# Patient Record
Sex: MALE | Race: WHITE | NOT HISPANIC OR LATINO | Employment: FULL TIME | URBAN - METROPOLITAN AREA
[De-identification: names, ages, dates, MRNs, and addresses within clinical notes are randomized per-mention and may not be internally consistent; named-entity substitution may affect disease eponyms.]

---

## 2018-03-14 ENCOUNTER — OFFICE VISIT (OUTPATIENT)
Dept: FAMILY MEDICINE | Facility: CLINIC | Age: 33
End: 2018-03-14
Payer: COMMERCIAL

## 2018-03-14 VITALS
OXYGEN SATURATION: 97 % | WEIGHT: 276.4 LBS | HEART RATE: 77 BPM | SYSTOLIC BLOOD PRESSURE: 122 MMHG | TEMPERATURE: 98 F | HEIGHT: 77 IN | RESPIRATION RATE: 14 BRPM | BODY MASS INDEX: 32.63 KG/M2 | DIASTOLIC BLOOD PRESSURE: 76 MMHG

## 2018-03-14 DIAGNOSIS — J20.8 ACUTE VIRAL BRONCHITIS: Primary | ICD-10-CM

## 2018-03-14 PROCEDURE — 99213 OFFICE O/P EST LOW 20 MIN: CPT | Performed by: FAMILY MEDICINE

## 2018-03-14 NOTE — MR AVS SNAPSHOT
After Visit Summary   3/14/2018    John Dang    MRN: [id number]           Patient Information     Date Of Birth          1985        Visit Information        Provider Department      3/14/2018 11:20 AM Dheeraj Monk MD [facility]        Today's Diagnoses     Acute viral bronchitis    -  1      Care Instructions    You preferred to take Robitussin DM over the counter; take 5-10 ml of this medication 4 hours apart as needed for coughing spells.    Stop Azithromycin.    You are strongly advised to avoid taking non-prescribed antibiotics or self-medicating with any antibiotics in the future.    Bronchitis, Viral (Adult)    You have a viral bronchitis. Bronchitis is inflammation and swelling of the lining of the lungs. This is often caused by an infection. Symptoms include a dry, hacking cough that is worse at night. The cough may bring up yellow-green mucus. You may also feel short of breath or wheeze. Other symptoms may include tiredness, chest discomfort, and chills.  Bronchitis that is caused by a virus is not treated with antibiotics. Instead, medicines may be given to help relieve symptoms. Symptoms can last up to 2 weeks, although the cough may last much longer.  This illness is contagious during the first few days and is spread through the air by coughing and sneezing, or by direct contact (touching the sick person and then touching your own eyes, nose, or mouth).  Most viral illnesses resolve within 10 to 14 days with rest and simple home remedies, although they may sometimes last for several weeks.  Home care    If symptoms are severe, rest at home for the first 2 to 3 days. When you go back to your usual activities, don't let yourself get too tired.    Do not smoke. Also avoid being exposed to secondhand smoke.    You may use over-the-counter medicine to control fever or pain, unless another pain medicine was prescribed. (Note: If you have chronic liver  or kidney disease or have ever had a stomach ulcer or gastrointestinal bleeding, talk with your healthcare provider before using these medicines. Also talk to your provider if you are taking medicine to prevent blood clots.) Aspirin should never be given to anyone younger than 18 years of age who is ill with a viral infection or fever. It may cause severe liver or brain damage.    Your appetite may be poor, so a light diet is fine. Avoid dehydration by drinking 6 to 8 glasses of fluids per day (such as water, soft drinks, sports drinks, juices, tea, or soup). Extra fluids will help loosen secretions in the nose and lungs.    Over-the-counter cough, cold, and sore-throat medicines will not shorten the length of the illness, but they may help to reduce symptoms. (Note: Do not use decongestants if you have high blood pressure.)  Follow-up care  Follow up with your healthcare provider, or as advised. If you had an X-ray or ECG (electrocardiogram), a specialist will review it. You will be notified of any new findings that may affect your care.  Note: If you are age 65 or older, or if you have a chronic lung disease or condition that affects your immune system, or you smoke, talk to your healthcare provider about having pneumococcal vaccinations and a yearly influenza vaccination (flu shot).  When to seek medical advice  Call your healthcare provider right away if any of these occur:    Fever of 100.4 F (38 C) or higher    Coughing up increased amounts of colored sputum    Weakness, drowsiness, headache, facial pain, ear pain, or a stiff neck  Call 911, or get immediate medical care  Contact emergency services right away if any of these occur:    Coughing up blood    Worsening weakness, drowsiness, headache, or stiff neck    Trouble breathing, wheezing, or pain with breathing  Date Last Reviewed: 9/13/2015 2000-2017 [employer]. [address] [facility], [place] [address]. All rights reserved. This  "information is not intended as a substitute for professional medical care. Always follow your healthcare professional's instructions.                Follow-ups after your visit        Who to contact     If you have questions or need follow up information about today's clinic visit or your schedule please contact [facility] directly at [phone].  Normal or non-critical lab and imaging results will be communicated to you by MyChart, letter or phone within 4 business days after the clinic has received the results. If you do not hear from us within 7 days, please contact the clinic through [employer] or phone. If you have a critical or abnormal lab result, we will notify you by phone as soon as possible.  Submit refill requests through [employer] or call your pharmacy and they will forward the refill request to us. Please allow 3 business days for your refill to be completed.          Additional Information About Your Visit        MyChart Information     [employer] gives you secure access to your electronic health record. If you see a primary care provider, you can also send messages to your care team and make appointments. If you have questions, please call your primary care clinic.  If you do not have a primary care provider, please call [phone] and they will assist you.        Care EveryWhere ID     This is your Care EveryWhere ID. This could be used by other organizations to access your [facility] medical records  [id number]        Your Vitals Were     Pulse Temperature Respirations Height Pulse Oximetry BMI (Body Mass Index)    77 98  F (36.7  C) (Tympanic) 14 6' 4.5\" (1.943 m) 97% 33.21 kg/m2       Blood Pressure from Last 3 Encounters:   03/14/18 122/76   08/26/15 134/78   11/13/13 132/81    Weight from Last 3 Encounters:   03/14/18 276 lb 6.4 oz (125.4 kg)   08/26/15 252 lb 9.6 oz (114.6 kg)   11/13/13 236 lb 6.4 oz (107.2 kg)              Today, you had the following     No orders found for " display       Primary Care Provider Office Phone # Fax #    [facility] [phone]       [address] [facility] [address]  [place] [address]        Equal Access to Services     LILLIE NEELY : Hadii aad ku hadsallieo Soomaali, waaxda luqadaha, qaybta kaalmada adeegyada, greg cuellarn carmella wise laSuechristiane medley. So [facility] [phone].    ATENCIÓN: Si habla español, tiene a zhou disposición servicios gratuitos de asistencia lingüística. Llame al [phone].    We comply with applicable federal civil rights laws and [place] laws. We do not discriminate on the basis of race, color, national origin, age, disability, sex, sexual orientation, or gender identity.            Thank you!     Thank you for choosing [facility]  for your care. Our goal is always to provide you with excellent care. Hearing back from our patients is one way we can continue to improve our services. Please take a few minutes to complete the written survey that you may receive in the mail after your visit with us. Thank you!             Your Updated Medication List - Protect others around you: Learn how to safely use, store and throw away your medicines at www.disposemymeds.org.      Notice  As of 3/14/2018 11:30 AM    You have not been prescribed any medications.

## 2018-03-14 NOTE — PROGRESS NOTES
"  SUBJECTIVE:   John Dang is a 33 year old male who presents to clinic today for the following health issues:  {Provider please address medication reconciliation discrepancies--rooming staff please delete if no med/rec issues}    FOLLOW UP BRONCHITIS      Duration: ***    Description  { :775346}    Severity: {MILD, MODERATE, SEVERE LOW:255318}    Accompanying signs and symptoms: {NONE DEFAULTED:418426::\"None\"}    History (predisposing factors):  { :498937::\"none\"}    Precipitating or alleviating factors: {NONE DEFAULTED:754746::\"None\"}    Therapies tried and outcome:  { :490477::\"none\"}      {additional problems for provider to add:915751}    Problem list and histories reviewed & adjusted, as indicated.  Additional history: {NONE - AS DOCUMENTED:441081::\"as documented\"}    {HIST REVIEW/ LINKS 2:803617}    Reviewed and updated as needed this visit by clinical staff       Reviewed and updated as needed this visit by Provider         {PROVIDER CHARTING PREFERENCE:767100}  "

## 2018-03-14 NOTE — PATIENT INSTRUCTIONS
You preferred to take Robitussin DM over the counter; take 5-10 ml of this medication 4 hours apart as needed for coughing spells.    Stop Azithromycin.    You are strongly advised to avoid taking non-prescribed antibiotics or self-medicating with any antibiotics in the future.    Bronchitis, Viral (Adult)    You have a viral bronchitis. Bronchitis is inflammation and swelling of the lining of the lungs. This is often caused by an infection. Symptoms include a dry, hacking cough that is worse at night. The cough may bring up yellow-green mucus. You may also feel short of breath or wheeze. Other symptoms may include tiredness, chest discomfort, and chills.  Bronchitis that is caused by a virus is not treated with antibiotics. Instead, medicines may be given to help relieve symptoms. Symptoms can last up to 2 weeks, although the cough may last much longer.  This illness is contagious during the first few days and is spread through the air by coughing and sneezing, or by direct contact (touching the sick person and then touching your own eyes, nose, or mouth).  Most viral illnesses resolve within 10 to 14 days with rest and simple home remedies, although they may sometimes last for several weeks.  Home care    If symptoms are severe, rest at home for the first 2 to 3 days. When you go back to your usual activities, don't let yourself get too tired.    Do not smoke. Also avoid being exposed to secondhand smoke.    You may use over-the-counter medicine to control fever or pain, unless another pain medicine was prescribed. (Note: If you have chronic liver or kidney disease or have ever had a stomach ulcer or gastrointestinal bleeding, talk with your healthcare provider before using these medicines. Also talk to your provider if you are taking medicine to prevent blood clots.) Aspirin should never be given to anyone younger than 18 years of age who is ill with a viral infection or fever. It may cause severe liver or brain  damage.    Your appetite may be poor, so a light diet is fine. Avoid dehydration by drinking 6 to 8 glasses of fluids per day (such as water, soft drinks, sports drinks, juices, tea, or soup). Extra fluids will help loosen secretions in the nose and lungs.    Over-the-counter cough, cold, and sore-throat medicines will not shorten the length of the illness, but they may help to reduce symptoms. (Note: Do not use decongestants if you have high blood pressure.)  Follow-up care  Follow up with your healthcare provider, or as advised. If you had an X-ray or ECG (electrocardiogram), a specialist will review it. You will be notified of any new findings that may affect your care.  Note: If you are age 65 or older, or if you have a chronic lung disease or condition that affects your immune system, or you smoke, talk to your healthcare provider about having pneumococcal vaccinations and a yearly influenza vaccination (flu shot).  When to seek medical advice  Call your healthcare provider right away if any of these occur:    Fever of 100.4 F (38 C) or higher    Coughing up increased amounts of colored sputum    Weakness, drowsiness, headache, facial pain, ear pain, or a stiff neck  Call 911, or get immediate medical care  Contact emergency services right away if any of these occur:    Coughing up blood    Worsening weakness, drowsiness, headache, or stiff neck    Trouble breathing, wheezing, or pain with breathing  Date Last Reviewed: 9/13/2015 2000-2017 [employer]. [address] [place] [address], [place] [address]. All rights reserved. This information is not intended as a substitute for professional medical care. Always follow your healthcare professional's instructions.

## 2018-03-14 NOTE — PROGRESS NOTES
SUBJECTIVE:   John Dang is a 33 year old male who presents to clinic today for the following health issues:    Acute Illness   Acute illness concerns: Cough, possible bronchitis  Onset:  3 days    Fever: no    Chills/Sweats: YES- both    Headache (location?): YES    Sinus Pressure:YES    Conjunctivitis:  YES: left- behind eye    Ear Pain: YES: left    Rhinorrhea: YES    Congestion: YES    Sore Throat: YES     Cough: YES    Wheeze: YES    Decreased Appetite: YES    Nausea: no    Vomiting: no    Diarrhea:  no    Dysuria/Freq.: no    Fatigue/Achiness: YES- fatigue    Sick/Strep Exposure: no     Therapies Tried and outcome: ibuprofen and azithromycin (had some at home) - hasn't made a difference.    Verified above history with patient.      Problem list and histories reviewed & adjusted, as indicated.  Additional history: as documented    Patient Active Problem List   Diagnosis     CARDIOVASCULAR SCREENING; LDL GOAL LESS THAN 160     Past Surgical History:   Procedure Laterality Date     SOFT TISSUE SURGERY         Social History   Substance Use Topics     Smoking status: Never Smoker     Smokeless tobacco: Never Used     Alcohol use 1.0 oz/week     2 Standard drinks or equivalent per week      Comment: occ.     Family History   Problem Relation Age of Onset     DIABETES Mother      Arthritis Mother      Depression Mother      Arthritis Father      Arthritis Maternal Grandmother      Eye Disorder Paternal Grandmother          No current outpatient prescriptions on file.     No Known Allergies    Reviewed and updated as needed this visit by clinical staff  Tobacco  Allergies  Meds  Med Hx  Surg Hx  Fam Hx  Soc Hx      Reviewed and updated as needed this visit by Provider         ROS:  C: NEGATIVE for fever, chills or  change in weight  I: NEGATIVE for worrisome rashes, moles or lesions  E: NEGATIVE for vision changes or irritation  ENT/MOUTH: see above  RESP:as above  CV: NEGATIVE for chest pain,  "palpitations or peripheral edema  GI: NEGATIVE for nausea, abdominal pain, heartburn, or change in bowel habits  M: NEGATIVE for significant arthralgias or myalgia    OBJECTIVE:                                                    /76  Pulse 77  Temp 98  F (36.7  C) (Tympanic)  Resp 14  Ht 6' 4.5\" (1.943 m)  Wt 276 lb 6.4 oz (125.4 kg)  SpO2 97%  BMI 33.21 kg/m2  Body mass index is 33.21 kg/(m^2).  GENERAL: well-nourished,  alert and no distress  EYES: pink conjunctivae, no icterus  NECK: moderate cervical adenopathy, nontender  HEENT: tympanic membrane intact and pearly bilaterally, nose with mild congestion, no sinus tenderness, throat moderately erythematous, tonsils grade +1 w/ no exudates, no oral ulcers  RESP: good and equal air entry, equal breath sounds, no wheezing; no crackles  CV: normal rate, regular rhythm, normal S1 S2, no S3 or S4 and no murmur  SKIN:  Good turgor, no rashes    Diagnostic test results:  Diagnostic Test Results:  No results found for this or any previous visit (from the past 24 hour(s)).     ASSESSMENT/PLAN:                                                        ICD-10-CM    1. Acute viral bronchitis J20.8      Patient was resassured no sign of bacterial process today.  Patient was advised to stop azithromycin.    Discussed symptoms are likely due to viral etiology. Discussed usual course of viral infections; self-limited.   Advised to take plenty of oral fluids. Advised adequate rest. General hygiene.     Offered Rx for benzonatate as antitussive, but patient preferred to take OTC Robitussin DM prn. Use discussed with patient.    Advised to see doctor again if worsening or with new sx.      Follow up with Provider - 2-3 days if with worsening or if febrile   Patient Instructions   You preferred to take Robitussin DM over the counter; take 5-10 ml of this medication 4 hours apart as needed for coughing spells.    Stop Azithromycin.    You are strongly advised to avoid taking " non-prescribed antibiotics or self-medicating with any antibiotics in the future.    Bronchitis, Viral (Adult)    You have a viral bronchitis. Bronchitis is inflammation and swelling of the lining of the lungs. This is often caused by an infection. Symptoms include a dry, hacking cough that is worse at night. The cough may bring up yellow-green mucus. You may also feel short of breath or wheeze. Other symptoms may include tiredness, chest discomfort, and chills.  Bronchitis that is caused by a virus is not treated with antibiotics. Instead, medicines may be given to help relieve symptoms. Symptoms can last up to 2 weeks, although the cough may last much longer.  This illness is contagious during the first few days and is spread through the air by coughing and sneezing, or by direct contact (touching the sick person and then touching your own eyes, nose, or mouth).  Most viral illnesses resolve within 10 to 14 days with rest and simple home remedies, although they may sometimes last for several weeks.  Home care    If symptoms are severe, rest at home for the first 2 to 3 days. When you go back to your usual activities, don't let yourself get too tired.    Do not smoke. Also avoid being exposed to secondhand smoke.    You may use over-the-counter medicine to control fever or pain, unless another pain medicine was prescribed. (Note: If you have chronic liver or kidney disease or have ever had a stomach ulcer or gastrointestinal bleeding, talk with your healthcare provider before using these medicines. Also talk to your provider if you are taking medicine to prevent blood clots.) Aspirin should never be given to anyone younger than 18 years of age who is ill with a viral infection or fever. It may cause severe liver or brain damage.    Your appetite may be poor, so a light diet is fine. Avoid dehydration by drinking 6 to 8 glasses of fluids per day (such as water, soft drinks, sports drinks, juices, tea, or soup).  Extra fluids will help loosen secretions in the nose and lungs.    Over-the-counter cough, cold, and sore-throat medicines will not shorten the length of the illness, but they may help to reduce symptoms. (Note: Do not use decongestants if you have high blood pressure.)  Follow-up care  Follow up with your healthcare provider, or as advised. If you had an X-ray or ECG (electrocardiogram), a specialist will review it. You will be notified of any new findings that may affect your care.  Note: If you are age 65 or older, or if you have a chronic lung disease or condition that affects your immune system, or you smoke, talk to your healthcare provider about having pneumococcal vaccinations and a yearly influenza vaccination (flu shot).  When to seek medical advice  Call your healthcare provider right away if any of these occur:    Fever of 100.4 F (38 C) or higher    Coughing up increased amounts of colored sputum    Weakness, drowsiness, headache, facial pain, ear pain, or a stiff neck  Call 911, or get immediate medical care  Contact emergency services right away if any of these occur:    Coughing up blood    Worsening weakness, drowsiness, headache, or stiff neck    Trouble breathing, wheezing, or pain with breathing  Date Last Reviewed: 9/13/2015 2000-2017 [employer]. [address] [place] [address], [place] [id number]. All rights reserved. This information is not intended as a substitute for professional medical care. Always follow your healthcare professional's instructions.            Dheeraj Monk MD  [facility]

## 2018-03-14 NOTE — NURSING NOTE
"Chief Complaint   Patient presents with     Cough       Initial /87  Pulse 77  Temp 98  F (36.7  C) (Tympanic)  Resp 16  Ht 6' 4.5\" (1.943 m)  Wt 276 lb 6.4 oz (125.4 kg)  SpO2 97%  BMI 33.21 kg/m2 Estimated body mass index is 33.21 kg/(m^2) as calculated from the following:    Height as of this encounter: 6' 4.5\" (1.943 m).    Weight as of this encounter: 276 lb 6.4 oz (125.4 kg).  Medication Reconciliation: complete    "

## 2018-06-18 ENCOUNTER — RADIANT APPOINTMENT (OUTPATIENT)
Dept: GENERAL RADIOLOGY | Facility: CLINIC | Age: 33
End: 2018-06-18
Attending: PHYSICIAN ASSISTANT
Payer: COMMERCIAL

## 2018-06-18 ENCOUNTER — OFFICE VISIT (OUTPATIENT)
Dept: ORTHOPEDICS | Facility: CLINIC | Age: 33
End: 2018-06-18
Payer: COMMERCIAL

## 2018-06-18 VITALS
WEIGHT: 283 LBS | RESPIRATION RATE: 16 BRPM | SYSTOLIC BLOOD PRESSURE: 121 MMHG | BODY MASS INDEX: 34.46 KG/M2 | DIASTOLIC BLOOD PRESSURE: 74 MMHG | HEIGHT: 76 IN

## 2018-06-18 DIAGNOSIS — M25.561 ACUTE PAIN OF RIGHT KNEE: Primary | ICD-10-CM

## 2018-06-18 DIAGNOSIS — M25.561 ACUTE PAIN OF RIGHT KNEE: ICD-10-CM

## 2018-06-18 DIAGNOSIS — M70.51 PES ANSERINUS BURSITIS OF RIGHT KNEE: ICD-10-CM

## 2018-06-18 PROCEDURE — 99203 OFFICE O/P NEW LOW 30 MIN: CPT | Performed by: ORTHOPAEDIC SURGERY

## 2018-06-18 PROCEDURE — 73562 X-RAY EXAM OF KNEE 3: CPT | Mod: RT

## 2018-06-18 ASSESSMENT — PAIN SCALES - GENERAL: PAINLEVEL: EXTREME PAIN (8)

## 2018-06-18 NOTE — PROGRESS NOTES
"CHIEF COMPLAINT:   Chief Complaint   Patient presents with     Knee Pain     Right medial knee pain. Onset: 6/14/18, with NKI. Woke him in the middle of the night. Pain is worse in the morning and gets better throughout the course of the day. He does have a difficult time staying asleep at night because positional changes of the knee are painful.       HISTORY OF PRESENT ILLNESS    John \"Nader\" BRITNEY Dang is a 33 year old male seen for evaluation of ongoing right knee pain with no known injury.   Pain has been present since 6/14/2018. He denies any change or increase in activity. Today he has extreme pain, rated an 8/10. Pain is mostly located over the medial knee, but also along the inferior knee. Pain is worsened with any twisting the knee. Also has pain with flexion and extension. He did not have any swelling. Pain is mostly in the morning. As the day continues, his pain improves, 3/10. He will have some night pain with re-positioning.     Of note: will have right muscle cramps when he is dehydrated. No history of gout.     Present symptoms: pain medial. No swelling. No locking, catching or giving way.  Pain severity: 3/10 up to 8/10 in the morning  Frequency of symptoms: frequently  Exacerbating Factors: morning, with knee flexion and extension  Relieving Factors: rest  Night Pain: Yes  Pain while at rest: Yes   Numbness or tingling: No  Patient has tried:     NSAIDS: No      Physical Therapy: No      Activity modification: Yes      Bracing: No      Injections: No     Ice: No      Assistive device:  No     Other: none    Orthopaedic PMH: none    Other PMH:  has a past medical history of No significant past medical history.  Patient Active Problem List    Diagnosis Date Noted     CARDIOVASCULAR SCREENING; LDL GOAL LESS THAN 160 12/10/2012     Priority: Medium       Surgical Hx:  has a past surgical history that includes Soft tissue surgery.    Medications: No current outpatient prescriptions on " "file.    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 He has never used smokeless tobacco. He reports that he drinks about 1.0 oz of alcohol per week  He reports that he does not use illicit drugs.    Family Hx: family history includes Arthritis in his father, maternal grandmother, and mother; DIABETES in his mother; Depression in his mother; Eye Disorder in his paternal grandmother.    REVIEW OF SYSTEMS: 10 point ROS neg other than the symptoms noted above in the HPI and PMH. Notables include  CONSTITUTIONAL:NEGATIVE for fever, chills, change in weight  INTEGUMENTARY/SKIN: NEGATIVE for worrisome rashes, moles or lesions  MUSCULOSKELETAL:See HPI above  NEURO: NEGATIVE for weakness, dizziness or paresthesias    This document serves as a record of the services and decisions personally performed and made by Kevin Bates MD. It was created on his behalf by Chela Williamson, a trained medical scribe. The creation of this document is based the provider's statements to the medical scribe.    Scribe Chela Williamson 4:14 PM 6/18/2018     PHYSICAL EXAM:  /74 (BP Location: Right arm)  Resp 16  Ht 1.93 m (6' 4\")  Wt 128.4 kg (283 lb)  BMI 34.45 kg/m2   GENERAL APPEARANCE: healthy, alert, no distress  SKIN: no suspicious lesions or rashes  NEURO: Normal strength and tone, mentation intact and speech normal  PSYCH:  mentation appears normal and affect normal, not anxious  RESPIRATORY: No increased work of breathing.  HANDS: no clubbing, nail pitting.    BILATERAL LOWER EXTREMITIES:  Gait: normal  Alignment: varus  No gross deformities or masses.  No Quad atrophy, strength normal.  Intact sensation deep peroneal nerve, superficial peroneal nerve, med/lat tibial nerve, sural nerve, saphenous nerve  Intact EHL, EDL, TA, FHL, GS, quadriceps hamstrings and hip flexors  Toes warm and well perfused, brisk capillary refill. Palpable 2+ dp pulses.  Bilateral calf soft and nttp or squeeze.  No " palpable popliteal lymphadenopathy.  DTRs: achilles 2+, patella 2+.  Edema: none  Hips with full, pain-free motion. No irritability with flexion, adduction, and internal rotation.    LEFT KNEE EXAM:    Skin: intact, no ecchymosis or erythema  Squat: 100 %, not limited by pain.     ROM: 2 hyperextension to 135 flexion  Tight hamstrings on straight leg raise.  Effusion: none  Tender: NTTP med/lat joint line, anterior or posterior knee  McMurrays: negative    MCL: stable, and non-painful at both 0 and 30 degrees knee flexion  Varus stress: stable, and non-painful at both 0 and 30 degrees knee flexion  Lachmans: neg, firm endpoint  Posterior Drawer stable  Patellofemoral joint:                Apprehension: negative              Crepitations: minimal   Grind: negative     RIGHT KNEE EXAM:    Skin: intact, no ecchymosis or erythema  Squat: 50 %, with anterior knee stiffness.   ROM: 2 hyperextension to 135 flexion  Tight hamstrings on straight leg raise.  Effusion: none  Tender: posteromedial knee, pes anserine bursa, hamstrings  NTTP med/lat joint line, anterior or posterior knee  McMurrays: positive - medial    MCL: stable, and non-painful at both 0 and 30 degrees knee flexion  Varus stress: stable, and non-painful at both 0 and 30 degrees knee flexion  Lachmans: neg, firm endpoint  Posterior Drawer stable  Patellofemoral joint:                Apprehension: negative              Crepitations: minimal   Grind: negative     X-RAY:  3 views right knee from 6/18/2018 were reviewed in clinic today. On my review, no obvious fractures or dislocations. Preserved joint spaces.      Impression:   33 year old male with acute right knee pain, pes anserine bursitis, hamstring tendonitis    Plan:   * reviewed imaging studies with patient  * likely hamstrings tendonitis and pes anserine bursitis    Treatment:    * rest, sitting  * Activity modification - avoid impact activities or activities that aggravate symptoms. Avoid repetitive  activities.  * NSAIDS (non-steroidal anti-inflammatory medications; e.g. Aleve, advil, motrin, ibuprofen) - regular use for inflammation ( twice daily or three times daily), with food, as long as no contra-indications Please discuss with primary care doctor if needed  * ice, 15-20 minutes at a time several times a day or as needed.  * Strengthening of quadriceps muscles  * Physical Therapy for strengthening, stretching and range of motion exercises of legs  * Tylenol as needed for pain, consider Tylenol arthritis or similar  * Weight loss: Weight loss:  Body mass index is 34.45 kg/(m^2).. weight loss benefits, not only for the current pain symptoms, but also overall health. Recommend a good diet plan that works for the patient, with the assistance of a dietician or primary care doctor as needed. Also, a good, low-impact exercise program for at least 20 minutes per day, 3 times per week, such as exercise bike, elliptical , or pool.  * Exercise: low impact such as stationary bike, elliptical, pool.  * Injections: cortisone; risks and perceived benefits discussed today. Patient elects NOT to proceed.  * Bracing: bracing the knee may offer some relief of symptoms when worn and provide some stability.  * over the counter supplements such as glucosamine and chondroitin sulfate may help with joint pain.  * topical ointments may help as well    * return to clinic as needed.      The information in this document, created by a scribe for me, accurately reflects the services I personally performed and the decisions made by me. I have reviewed and approved this document for accuracy.     Kevin Bates M.D., M.S.  Dept. of Orthopaedic Surgery  [facility]

## 2018-06-18 NOTE — LETTER
"    6/18/2018         RE: John Dang  [id number] [facility] [address]        Dear Colleague,    Thank you for referring your patient, John Dang, to the [facility] [place]. Please see a copy of my visit note below.    CHIEF COMPLAINT:   Chief Complaint   Patient presents with     Knee Pain     Right medial knee pain. Onset: 6/14/18, with NKI. Woke him in the middle of the night. Pain is worse in the morning and gets better throughout the course of the day. He does have a difficult time staying asleep at night because positional changes of the knee are painful.       HISTORY OF PRESENT ILLNESS    John \"Nader\"BRITNEY Dang is a 33 year old male seen for evaluation of ongoing right knee pain with no known injury.   Pain has been present since 6/14/2018. He denies any change or increase in activity. Today he has extreme pain, rated an 8/10. Pain is mostly located over the medial knee, but also along the inferior knee. Pain is worsened with any twisting the knee. Also has pain with flexion and extension. He did not have any swelling. Pain is mostly in the morning. As the day continues, his pain improves, 3/10. He will have some night pain with re-positioning.     Of note: will have right muscle cramps when he is dehydrated. No history of gout.     Present symptoms: pain medial. No swelling. No locking, catching or giving way.  Pain severity: 3/10 up to 8/10 in the morning  Frequency of symptoms: frequently  Exacerbating Factors: morning, with knee flexion and extension  Relieving Factors: rest  Night Pain: Yes  Pain while at rest: Yes   Numbness or tingling: No  Patient has tried:     NSAIDS: No      Physical Therapy: No      Activity modification: Yes      Bracing: No      Injections: No     Ice: No      Assistive device:  No     Other: none    Orthopaedic PMH: none    Other PMH:  has a past medical history of No significant past medical history.  Patient Active " "Problem List    Diagnosis Date Noted     CARDIOVASCULAR SCREENING; LDL GOAL LESS THAN 160 12/10/2012     Priority: Medium       Surgical Hx:  has a past surgical history that includes Soft tissue surgery.    Medications: No current outpatient prescriptions on file.    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 He has never used smokeless tobacco. He reports that he drinks about 1.0 oz of alcohol per week  He reports that he does not use illicit drugs.    Family Hx: family history includes Arthritis in his father, maternal grandmother, and mother; DIABETES in his mother; Depression in his mother; Eye Disorder in his paternal grandmother.    REVIEW OF SYSTEMS: 10 point ROS neg other than the symptoms noted above in the HPI and PMH. Notables include  CONSTITUTIONAL:NEGATIVE for fever, chills, change in weight  INTEGUMENTARY/SKIN: NEGATIVE for worrisome rashes, moles or lesions  MUSCULOSKELETAL:See HPI above  NEURO: NEGATIVE for weakness, dizziness or paresthesias    This document serves as a record of the services and decisions personally performed and made by Kevin Bates MD. It was created on his behalf by Chela Williamson, a trained medical scribe. The creation of this document is based the provider's statements to the medical scribe.    Scribe Chela Williamson 4:14 PM 6/18/2018     PHYSICAL EXAM:  /74 (BP Location: Right arm)  Resp 16  Ht 1.93 m (6' 4\")  Wt 128.4 kg (283 lb)  BMI 34.45 kg/m2   GENERAL APPEARANCE: healthy, alert, no distress  SKIN: no suspicious lesions or rashes  NEURO: Normal strength and tone, mentation intact and speech normal  PSYCH:  mentation appears normal and affect normal, not anxious  RESPIRATORY: No increased work of breathing.  HANDS: no clubbing, nail pitting.    BILATERAL LOWER EXTREMITIES:  Gait: normal  Alignment: varus  No gross deformities or masses.  No Quad atrophy, strength normal.  Intact sensation deep peroneal nerve, " superficial peroneal nerve, med/lat tibial nerve, sural nerve, saphenous nerve  Intact EHL, EDL, TA, FHL, GS, quadriceps hamstrings and hip flexors  Toes warm and well perfused, brisk capillary refill. Palpable 2+ dp pulses.  Bilateral calf soft and nttp or squeeze.  No palpable popliteal lymphadenopathy.  DTRs: achilles 2+, patella 2+.  Edema: none  Hips with full, pain-free motion. No irritability with flexion, adduction, and internal rotation.    LEFT KNEE EXAM:    Skin: intact, no ecchymosis or erythema  Squat: 100 %, not limited by pain.     ROM: 2 hyperextension to 135 flexion  Tight hamstrings on straight leg raise.  Effusion: none  Tender: NTTP med/lat joint line, anterior or posterior knee  McMurrays: negative    MCL: stable, and non-painful at both 0 and 30 degrees knee flexion  Varus stress: stable, and non-painful at both 0 and 30 degrees knee flexion  Lachmans: neg, firm endpoint  Posterior Drawer stable  Patellofemoral joint:                Apprehension: negative              Crepitations: minimal   Grind: negative     RIGHT KNEE EXAM:    Skin: intact, no ecchymosis or erythema  Squat: 50 %, with anterior knee stiffness.   ROM: 2 hyperextension to 135 flexion  Tight hamstrings on straight leg raise.  Effusion: none  Tender: posteromedial knee, pes anserine bursa, hamstrings  NTTP med/lat joint line, anterior or posterior knee  McMurrays: positive - medial    MCL: stable, and non-painful at both 0 and 30 degrees knee flexion  Varus stress: stable, and non-painful at both 0 and 30 degrees knee flexion  Lachmans: neg, firm endpoint  Posterior Drawer stable  Patellofemoral joint:                Apprehension: negative              Crepitations: minimal   Grind: negative     X-RAY:  3 views right knee from 6/18/2018 were reviewed in clinic today. On my review, no obvious fractures or dislocations. Preserved joint spaces.      Impression:   33 year old male with acute right knee pain, pes anserine bursitis,  hamstring tendonitis    Plan:   * reviewed imaging studies with patient  * likely hamstrings tendonitis and pes anserine bursitis    Treatment:    * rest, sitting  * Activity modification - avoid impact activities or activities that aggravate symptoms. Avoid repetitive activities.  * NSAIDS (non-steroidal anti-inflammatory medications; e.g. Aleve, advil, motrin, ibuprofen) - regular use for inflammation ( twice daily or three times daily), with food, as long as no contra-indications Please discuss with primary care doctor if needed  * ice, 15-20 minutes at a time several times a day or as needed.  * Strengthening of quadriceps muscles  * Physical Therapy for strengthening, stretching and range of motion exercises of legs  * Tylenol as needed for pain, consider Tylenol arthritis or similar  * Weight loss: Weight loss:  Body mass index is 34.45 kg/(m^2).. weight loss benefits, not only for the current pain symptoms, but also overall health. Recommend a good diet plan that works for the patient, with the assistance of a dietician or primary care doctor as needed. Also, a good, low-impact exercise program for at least 20 minutes per day, 3 times per week, such as exercise bike, elliptical , or pool.  * Exercise: low impact such as stationary bike, elliptical, pool.  * Injections: cortisone; risks and perceived benefits discussed today. Patient elects NOT to proceed.  * Bracing: bracing the knee may offer some relief of symptoms when worn and provide some stability.  * over the counter supplements such as glucosamine and chondroitin sulfate may help with joint pain.  * topical ointments may help as well    * return to clinic as needed.      The information in this document, created by a scribe for me, accurately reflects the services I personally performed and the decisions made by me. I have reviewed and approved this document for accuracy.     Kevin Bates M.D., M.S.  Dept. of Orthopaedic Surgery  [facility]        Again, thank you for allowing me to participate in the care of your patient.        Sincerely,        Kevin Bates MD

## 2019-01-21 ENCOUNTER — OFFICE VISIT (OUTPATIENT)
Dept: FAMILY MEDICINE | Facility: CLINIC | Age: 34
End: 2019-01-21
Payer: COMMERCIAL

## 2019-01-21 VITALS
WEIGHT: 303.6 LBS | BODY MASS INDEX: 36.97 KG/M2 | RESPIRATION RATE: 16 BRPM | OXYGEN SATURATION: 97 % | SYSTOLIC BLOOD PRESSURE: 128 MMHG | DIASTOLIC BLOOD PRESSURE: 78 MMHG | HEIGHT: 76 IN | TEMPERATURE: 98.2 F | HEART RATE: 81 BPM

## 2019-01-21 DIAGNOSIS — F41.1 GAD (GENERALIZED ANXIETY DISORDER): Primary | ICD-10-CM

## 2019-01-21 DIAGNOSIS — F32.1 MODERATE MAJOR DEPRESSION (H): ICD-10-CM

## 2019-01-21 PROCEDURE — 99213 OFFICE O/P EST LOW 20 MIN: CPT | Performed by: NURSE PRACTITIONER

## 2019-01-21 RX ORDER — CITALOPRAM HYDROBROMIDE 20 MG/1
TABLET ORAL
Qty: 90 TABLET | Refills: 0 | Status: SHIPPED | OUTPATIENT
Start: 2019-01-21 | End: 2019-04-19

## 2019-01-21 ASSESSMENT — ANXIETY QUESTIONNAIRES
IF YOU CHECKED OFF ANY PROBLEMS ON THIS QUESTIONNAIRE, HOW DIFFICULT HAVE THESE PROBLEMS MADE IT FOR YOU TO DO YOUR WORK, TAKE CARE OF THINGS AT HOME, OR GET ALONG WITH OTHER PEOPLE: EXTREMELY DIFFICULT
1. FEELING NERVOUS, ANXIOUS, OR ON EDGE: NEARLY EVERY DAY
7. FEELING AFRAID AS IF SOMETHING AWFUL MIGHT HAPPEN: NEARLY EVERY DAY
3. WORRYING TOO MUCH ABOUT DIFFERENT THINGS: NEARLY EVERY DAY
GAD7 TOTAL SCORE: 20
5. BEING SO RESTLESS THAT IT IS HARD TO SIT STILL: MORE THAN HALF THE DAYS
2. NOT BEING ABLE TO STOP OR CONTROL WORRYING: NEARLY EVERY DAY
6. BECOMING EASILY ANNOYED OR IRRITABLE: NEARLY EVERY DAY

## 2019-01-21 ASSESSMENT — MIFFLIN-ST. JEOR: SCORE: 2413.62

## 2019-01-21 ASSESSMENT — PATIENT HEALTH QUESTIONNAIRE - PHQ9: 5. POOR APPETITE OR OVEREATING: NEARLY EVERY DAY

## 2019-01-21 NOTE — PROGRESS NOTES
"  SUBJECTIVE:   Jhon Dang is a 33 year old male who presents to clinic today for the following health issues:      Abnormal Mood Symptoms  Onset: years, worsening and starting to cause problems    Description:   Depression: YES  Anxiety: YES    Accompanying Signs & Symptoms:  Still participating in activities that you used to enjoy: YES  Fatigue: no   Irritability: YES  Difficulty concentrating: YES  Changes in appetite: no   Problems with sleep: wakes early  Heart racing/beating fast : no   Thoughts of hurting yourself or others: yes    History:   Recent stress: YES- work and relationship   Prior depression hospitalization: None  Family history of depression: YES- states \"I assume so\"  Family history of anxiety: unsure   Mother with hyperthyroidism. Denies abnormal weight gain/loss, night sweats, heart palpitations, diarrhea/constipation, hair loss.    Precipitating factors:   Alcohol/drug use: YES- alcohol, between 2 and 10 drinks a few times a month     Alleviating factors:  None     Therapies Tried and outcome: tried a medication approximately 10 years ago, he doesn't remember what kind and only took it for a month and then stopped.     Problem list and histories reviewed & adjusted, as indicated.  Additional history: as documented    Patient Active Problem List   Diagnosis     CARDIOVASCULAR SCREENING; LDL GOAL LESS THAN 160     Past Surgical History:   Procedure Laterality Date     SOFT TISSUE SURGERY         Social History     Tobacco Use     Smoking status: Never Smoker     Smokeless tobacco: Never Used   Substance Use Topics     Alcohol use: Yes     Alcohol/week: 1.0 oz     Types: 2 Standard drinks or equivalent per week     Comment: occ.     Family History   Problem Relation Age of Onset     Diabetes Mother      Arthritis Mother      Depression Mother      Arthritis Father      Arthritis Maternal Grandmother      Eye Disorder Paternal Grandmother            Reviewed and updated as needed this " "visit by clinical staff       Reviewed and updated as needed this visit by Provider         ROS:  Constitutional, HEENT, cardiovascular, pulmonary, gi and gu systems are negative, except as otherwise noted.    OBJECTIVE:     /78   Pulse 81   Temp 98.2  F (36.8  C) (Tympanic)   Resp 16   Ht 1.93 m (6' 4\")   Wt 137.7 kg (303 lb 9.6 oz)   SpO2 97%   BMI 36.96 kg/m    Body mass index is 36.96 kg/m .  GENERAL: healthy, alert and no distress  NECK: no adenopathy, no asymmetry, masses, or scars and thyroid normal to palpation  RESP: lungs clear to auscultation - no rales, rhonchi or wheezes  CV: regular rates and rhythm, normal S1 S2, no S3 or S4, no murmur, click or rub and no peripheral edema  MS: no gross musculoskeletal defects noted, no edema  SKIN: no suspicious lesions or rashes  NEURO: Normal strength and tone, mentation intact and speech normal  PSYCH: mentation appears normal, affect normal/bright    Diagnostic Test Results:  none     ASSESSMENT/PLAN:     Depression; recurrent episode-- Moderate   Associated with the following complications:    None   Plan:  The following changes are made -   Medications:   SSRI - Trial Celexa  Referrals/Other:  Encourage regular exercise, Get plenty of rest and Counseling recommended      BMI:   Estimated body mass index is 36.96 kg/m  as calculated from the following:    Height as of this encounter: 1.93 m (6' 4\").    Weight as of this encounter: 137.7 kg (303 lb 9.6 oz).   Weight management plan: Discussed healthy diet and exercise guidelines        ICD-10-CM    1. MALISSA (generalized anxiety disorder) F41.1 citalopram (CELEXA) 20 MG tablet   2. Moderate major depression (H) F32.1 citalopram (CELEXA) 20 MG tablet       FUTURE APPOINTMENTS:       - Follow-up office or E-visit in 4-6 weeks. RETURN TO CLINIC sooner for new or worsening symptoms. Patient denies suicidal ideation. States he has fleeting passive thoughts of being better off dead, no plan, no desire to take " his life. Verbalizes he is safe at home.     Patient Instructions       Patient Education     Treating Anxiety Disorders with Medicine  An anxiety disorder can make you feel nervous or apprehensive, even without a clear reason. In people age 65 and older, generalized anxiety disorder is one of the most commonly diagnosed anxiety disorders. Many times it occurs with depression. Certain anxiety disorders can cause intense feelings of fear or panic. You may even have physical symptoms such as a racing heartbeat, sweating, or dizziness. If you have these feelings, you don t have to suffer anymore. Treatment to help you overcome your fears will likely include therapy (also called counseling). Medicine may also be prescribed to help control your symptoms.    Medicines  Certain medicines may be prescribed to help control your symptoms. So you may feel less anxious. You may also feel able to move forward with therapy. At first, medicines and dosages may need to be adjusted to find what works best for you. Try to be patient. Tell your healthcare provider how a medicine makes you feel. This way, you can work together to find the treatment that s best for you. Keep in mind that medicines can have side effects. Talk with your provider about any side effects that are bothering you. Changing the dose or type of medicine may help. Don t stop taking medicine on your own. That can cause symptoms to come back.    Anti-anxiety medicine. This medicine eases symptoms and helps you relax. Your healthcare provider will explain when and how to use it. It may be prescribed for use before situations that make you anxious. You may also be told to take medicine on a regular schedule. Anti-anxiety medicine may make you feel a little sleepy or  out of it.  Don t drive a car or operate machinery while on this medicine, until you know how it affects you.  Caution  Never use alcohol or other drugs with anti-anxiety medicines. This could result in  loss of muscular control, sedation, coma, or death. Also, use only the amount of medicine prescribed for you. If you think you may have taken too much, get emergency care right away.     Antidepressant medicine. This kind of medicine is often used to treat anxiety, even if you aren t depressed. An antidepressant helps balance out brain chemicals. This helps keep anxiety under control. This medicine is taken on a schedule. It takes a few weeks to start working. If you don t notice a change at first, you may just need more time. But if you don t notice results after the first few weeks, tell your provider.  Keep taking medicines as prescribed  Never change your dosage, share or use another person's medicine, or stop taking your medicines without talking to your healthcare provider first. Keep the following in mind:    Some medicines must be taken on a schedule. Make this part of your daily routine. For instance, always take your pill before brushing your teeth. A pillbox can help you remember if you ve taken your medicine each day.    Medicines are often taken for 6 to 12 months. Your healthcare provider will then evaluate whether you need to stay on them. Many people who have also had therapy may no longer need medicine to manage anxiety.    You may need to stop taking medicine slowly to give your body time to adjust. When it s time to stop, your healthcare provider will tell you more. Remember: Never stop taking your medicine without talking to your provider first.    If symptoms return, you may need to start taking medicines again. This isn t your fault. It s just the nature of your anxiety disorder.  Special concerns    Side effects. Medicines may cause side effects. Ask your healthcare provider or pharmacist what you can expect. They may have ideas for avoiding some side effects.    Sexual problems. Some antidepressants can affect your desire for sex or your ability to have an orgasm. A change in dosage or  medicine often solves the problem. If you have a sexual side effect that concerns you, tell your healthcare provider.    Addiction. If you ve never had a problem with drugs or alcohol, you may not have a problem with medicines used to treat anxiety disorders. But always discuss the medicines with your healthcare provider before taking them. If you have a history of addiction, you may not be able to use certain medicines used to treat anxiety disorders.    Medicine interactions. Always check with your pharmacist before using any over-the-counter medicines, including herbal supplements.   Date Last Reviewed: 5/1/2017 2000-2018 [employer]. [address] [place] [id number]. All rights reserved. This information is not intended as a substitute for professional medical care. Always follow your healthcare professional's instructions.           Patient Education     Treating Anxiety Disorders with Therapy    If you have an anxiety disorder, you don t have to suffer anymore. Treatment is available. Therapy (also called counseling) is often a helpful treatment for anxiety disorders. With therapy, a specially trained professional (therapist) helps you face and learn to manage your anxiety. Therapy can be short-term or long-term depending on your needs. In some cases, medicine may also be prescribed with therapy. It may take time before you notice how much therapy is helping, but stick with it. With therapy, you can feel better.  Cognitive behavioral therapy (CBT)  Cognitive behavioral therapy (CBT) teaches you to manage anxiety. It does this by helping you understand how you think and act when you re anxious. Research has shown CBT to be a very effective treatment for anxiety disorders. How CBT is run is almost like a class. It involves homework and activities to build skills that teach you to cope with anxiety step by step. It can be done in a group or one-on-one, and often takes place for a set  number of sessions. CBT has two main parts:    Cognitive therapy helps you identify the negative, irrational thoughts that occur with your anxiety. You ll learn to replace these with more positive, realistic thoughts.    Behavioral therapy helps you change how you react to anxiety. You ll learn coping skills and methods for relaxing to help you better deal with anxiety.  Other forms of therapy  Other therapy methods may work better for you than CBT. Or, you may move from CBT to another form of therapy as your treatment needs change. This may mean meeting with a therapist by yourself or in a group. Therapy can also help you work through problems in your life, such as drug or alcohol dependence, that may be making your anxiety worse.  Getting better takes time  Therapy will help you feel better and teach you skills to help manage anxiety long term. But change doesn t happen right away. It takes a commitment from you. And treatment only works if you learn to face the causes of your anxiety. So, you might feel worse before you feel better. This can sometimes make it hard to stick with it. But remember: Therapy is a very effective treatment. The results will be well worth it.  Helping yourself  If anxiety is wearing you down, here are some things you can do to cope:    Check with your doctor and rule out any physical problems that may be causing the anxiety symptoms.    If an anxiety disorder is diagnosed, seek mental healthcare. This is an illness and it can respond to treatment. Most types of anxiety disorders will respond to talk therapy and medicine.    Educate yourself about anxiety disorders. Keep track of helpful online resources and books you can use during stressful periods.    Try stress management techniques such as meditation.    Consider online or in-person support groups.    Don t fight your feelings. Anxiety feeds itself. The more you worry about it, the worse it gets. Instead, try to identify what might  have triggered your anxiety. Then try to put this threat in perspective.    Keep in mind that you can t control everything about a situation. Change what you can and let the rest take its course.    Exercise -- it s a great way to relieve tension and help your body feel relaxed.    Examine your life for stress, and try to find ways to reduce it.    Avoid caffeine and nicotine, which can make anxiety symptoms worse.    Fight the temptation to turn to alcohol or unprescribed drugs for relief. They only make things worse in the long run.   Date Last Reviewed: 1/1/2017 2000-2018 [employer]. [address] [facility], [place] [address]. All rights reserved. This information is not intended as a substitute for professional medical care. Always follow your healthcare professional's instructions.           Patient Education     Understanding Anxiety Disorders    Almost everyone gets nervous now and then. It s normal to have knots in your stomach before a test, or for your heart to race on a first date. But an anxiety disorder is much more than a case of nerves. In fact, its symptoms may be overwhelming. But treatment can relieve many of these symptoms. Talking to your healthcare provider is the first step.  What are anxiety disorders?  An anxiety disorder causes intense feelings of panic and fear. These feelings may arise for no apparent reason. And they tend to recur again and again. They may prevent you from coping with life and cause you great distress. As a result, you may avoid anything that triggers your fear. In extreme cases, you may never leave the house. Anxiety disorders may cause other symptoms, such as:    Obsessive thoughts you can t control    Constant nightmares or painful thoughts of the past    Nausea, sweating, and muscle tension    Trouble sleeping or concentrating  What causes anxiety disorders?  Anxiety disorders tend to run in families. For some people, childhood abuse or neglect may  play a role. For others, stressful life events or trauma may trigger anxiety disorders. Anxiety can trigger low self-esteem and poor coping skills.  Common anxiety disorders    Panic disorder. This causes an intense fear of being in danger.    Phobias. These are extreme fears of certain objects, places, or events.    Obsessive-compulsive disorder. This causes you to have unwanted thoughts and urges. You also may perform certain actions over and over.    Posttraumatic stress disorder. This occurs in people who have survived a terrible ordeal. It can cause nightmares and flashbacks about the event.    Generalized anxiety disorder. This causes constant worry that can greatly disrupt your life.   Getting better  You may believe that nothing can help you. Or, you might fear what others may think. But most anxiety symptoms can be eased. Having an anxiety disorder is nothing to be ashamed of. Most people do best with treatment that combines medicine and therapy. These aren t cures. But they can help you live a healthier life.  Date Last Reviewed: 2/1/2017 2000-2018 [employer]. [address] [place] [address], [place] [address]. All rights reserved. This information is not intended as a substitute for professional medical care. Always follow your healthcare professional's instructions.               ILA Hooks [facility]

## 2019-01-21 NOTE — PATIENT INSTRUCTIONS
Patient Education     Treating Anxiety Disorders with Medicine  An anxiety disorder can make you feel nervous or apprehensive, even without a clear reason. In people age 65 and older, generalized anxiety disorder is one of the most commonly diagnosed anxiety disorders. Many times it occurs with depression. Certain anxiety disorders can cause intense feelings of fear or panic. You may even have physical symptoms such as a racing heartbeat, sweating, or dizziness. If you have these feelings, you don t have to suffer anymore. Treatment to help you overcome your fears will likely include therapy (also called counseling). Medicine may also be prescribed to help control your symptoms.    Medicines  Certain medicines may be prescribed to help control your symptoms. So you may feel less anxious. You may also feel able to move forward with therapy. At first, medicines and dosages may need to be adjusted to find what works best for you. Try to be patient. Tell your healthcare provider how a medicine makes you feel. This way, you can work together to find the treatment that s best for you. Keep in mind that medicines can have side effects. Talk with your provider about any side effects that are bothering you. Changing the dose or type of medicine may help. Don t stop taking medicine on your own. That can cause symptoms to come back.    Anti-anxiety medicine. This medicine eases symptoms and helps you relax. Your healthcare provider will explain when and how to use it. It may be prescribed for use before situations that make you anxious. You may also be told to take medicine on a regular schedule. Anti-anxiety medicine may make you feel a little sleepy or  out of it.  Don t drive a car or operate machinery while on this medicine, until you know how it affects you.  Caution  Never use alcohol or other drugs with anti-anxiety medicines. This could result in loss of muscular control, sedation, coma, or death. Also, use only the  amount of medicine prescribed for you. If you think you may have taken too much, get emergency care right away.     Antidepressant medicine. This kind of medicine is often used to treat anxiety, even if you aren t depressed. An antidepressant helps balance out brain chemicals. This helps keep anxiety under control. This medicine is taken on a schedule. It takes a few weeks to start working. If you don t notice a change at first, you may just need more time. But if you don t notice results after the first few weeks, tell your provider.  Keep taking medicines as prescribed  Never change your dosage, share or use another person's medicine, or stop taking your medicines without talking to your healthcare provider first. Keep the following in mind:    Some medicines must be taken on a schedule. Make this part of your daily routine. For instance, always take your pill before brushing your teeth. A pillbox can help you remember if you ve taken your medicine each day.    Medicines are often taken for 6 to 12 months. Your healthcare provider will then evaluate whether you need to stay on them. Many people who have also had therapy may no longer need medicine to manage anxiety.    You may need to stop taking medicine slowly to give your body time to adjust. When it s time to stop, your healthcare provider will tell you more. Remember: Never stop taking your medicine without talking to your provider first.    If symptoms return, you may need to start taking medicines again. This isn t your fault. It s just the nature of your anxiety disorder.  Special concerns    Side effects. Medicines may cause side effects. Ask your healthcare provider or pharmacist what you can expect. They may have ideas for avoiding some side effects.    Sexual problems. Some antidepressants can affect your desire for sex or your ability to have an orgasm. A change in dosage or medicine often solves the problem. If you have a sexual side effect that  concerns you, tell your healthcare provider.    Addiction. If you ve never had a problem with drugs or alcohol, you may not have a problem with medicines used to treat anxiety disorders. But always discuss the medicines with your healthcare provider before taking them. If you have a history of addiction, you may not be able to use certain medicines used to treat anxiety disorders.    Medicine interactions. Always check with your pharmacist before using any over-the-counter medicines, including herbal supplements.   Date Last Reviewed: 5/1/2017 2000-2018 [employer]. [address] [place] [address]. All rights reserved. This information is not intended as a substitute for professional medical care. Always follow your healthcare professional's instructions.           Patient Education     Treating Anxiety Disorders with Therapy    If you have an anxiety disorder, you don t have to suffer anymore. Treatment is available. Therapy (also called counseling) is often a helpful treatment for anxiety disorders. With therapy, a specially trained professional (therapist) helps you face and learn to manage your anxiety. Therapy can be short-term or long-term depending on your needs. In some cases, medicine may also be prescribed with therapy. It may take time before you notice how much therapy is helping, but stick with it. With therapy, you can feel better.  Cognitive behavioral therapy (CBT)  Cognitive behavioral therapy (CBT) teaches you to manage anxiety. It does this by helping you understand how you think and act when you re anxious. Research has shown CBT to be a very effective treatment for anxiety disorders. How CBT is run is almost like a class. It involves homework and activities to build skills that teach you to cope with anxiety step by step. It can be done in a group or one-on-one, and often takes place for a set number of sessions. CBT has two main parts:    Cognitive therapy helps you  identify the negative, irrational thoughts that occur with your anxiety. You ll learn to replace these with more positive, realistic thoughts.    Behavioral therapy helps you change how you react to anxiety. You ll learn coping skills and methods for relaxing to help you better deal with anxiety.  Other forms of therapy  Other therapy methods may work better for you than CBT. Or, you may move from CBT to another form of therapy as your treatment needs change. This may mean meeting with a therapist by yourself or in a group. Therapy can also help you work through problems in your life, such as drug or alcohol dependence, that may be making your anxiety worse.  Getting better takes time  Therapy will help you feel better and teach you skills to help manage anxiety long term. But change doesn t happen right away. It takes a commitment from you. And treatment only works if you learn to face the causes of your anxiety. So, you might feel worse before you feel better. This can sometimes make it hard to stick with it. But remember: Therapy is a very effective treatment. The results will be well worth it.  Helping yourself  If anxiety is wearing you down, here are some things you can do to cope:    Check with your doctor and rule out any physical problems that may be causing the anxiety symptoms.    If an anxiety disorder is diagnosed, seek mental healthcare. This is an illness and it can respond to treatment. Most types of anxiety disorders will respond to talk therapy and medicine.    Educate yourself about anxiety disorders. Keep track of helpful online resources and books you can use during stressful periods.    Try stress management techniques such as meditation.    Consider online or in-person support groups.    Don t fight your feelings. Anxiety feeds itself. The more you worry about it, the worse it gets. Instead, try to identify what might have triggered your anxiety. Then try to put this threat in  perspective.    Keep in mind that you can t control everything about a situation. Change what you can and let the rest take its course.    Exercise -- it s a great way to relieve tension and help your body feel relaxed.    Examine your life for stress, and try to find ways to reduce it.    Avoid caffeine and nicotine, which can make anxiety symptoms worse.    Fight the temptation to turn to alcohol or unprescribed drugs for relief. They only make things worse in the long run.   Date Last Reviewed: 1/1/2017 2000-2018 [employer]. [address] [place] [address]. All rights reserved. This information is not intended as a substitute for professional medical care. Always follow your healthcare professional's instructions.           Patient Education     Understanding Anxiety Disorders    Almost everyone gets nervous now and then. It s normal to have knots in your stomach before a test, or for your heart to race on a first date. But an anxiety disorder is much more than a case of nerves. In fact, its symptoms may be overwhelming. But treatment can relieve many of these symptoms. Talking to your healthcare provider is the first step.  What are anxiety disorders?  An anxiety disorder causes intense feelings of panic and fear. These feelings may arise for no apparent reason. And they tend to recur again and again. They may prevent you from coping with life and cause you great distress. As a result, you may avoid anything that triggers your fear. In extreme cases, you may never leave the house. Anxiety disorders may cause other symptoms, such as:    Obsessive thoughts you can t control    Constant nightmares or painful thoughts of the past    Nausea, sweating, and muscle tension    Trouble sleeping or concentrating  What causes anxiety disorders?  Anxiety disorders tend to run in families. For some people, childhood abuse or neglect may play a role. For others, stressful life events or trauma may  trigger anxiety disorders. Anxiety can trigger low self-esteem and poor coping skills.  Common anxiety disorders    Panic disorder. This causes an intense fear of being in danger.    Phobias. These are extreme fears of certain objects, places, or events.    Obsessive-compulsive disorder. This causes you to have unwanted thoughts and urges. You also may perform certain actions over and over.    Posttraumatic stress disorder. This occurs in people who have survived a terrible ordeal. It can cause nightmares and flashbacks about the event.    Generalized anxiety disorder. This causes constant worry that can greatly disrupt your life.   Getting better  You may believe that nothing can help you. Or, you might fear what others may think. But most anxiety symptoms can be eased. Having an anxiety disorder is nothing to be ashamed of. Most people do best with treatment that combines medicine and therapy. These aren t cures. But they can help you live a healthier life.  Date Last Reviewed: 2/1/2017 2000-2018 [employer]. [address] [place] [address], [place] [address]. All rights reserved. This information is not intended as a substitute for professional medical care. Always follow your healthcare professional's instructions.

## 2019-01-22 ASSESSMENT — ANXIETY QUESTIONNAIRES: GAD7 TOTAL SCORE: 20

## 2019-04-15 DIAGNOSIS — F32.1 MODERATE MAJOR DEPRESSION (H): ICD-10-CM

## 2019-04-15 DIAGNOSIS — F41.1 GAD (GENERALIZED ANXIETY DISORDER): ICD-10-CM

## 2019-04-15 NOTE — TELEPHONE ENCOUNTER
"Requested Prescriptions   Pending Prescriptions Disp Refills     citalopram (CELEXA) 20 MG tablet 90 tablet 0     Sig: TAKE 1/2 PILL BY MOUTH DAILY FOR 1 WEEK AND THEN INCREASE TO A FULL PILL       SSRIs Protocol Failed - 4/15/2019 11:26 AM        Failed - PHQ-9 score less than 5 in past 6 months     Please review last PHQ-9 score.           Passed - Medication is active on med list        Passed - Patient is age 18 or older        Passed - Recent (6 mo) or future (30 days) visit within the authorizing provider's specialty     Patient had office visit in the last 6 months or has a visit in the next 30 days with authorizing provider or within the authorizing provider's specialty.  See \"Patient Info\" tab in inbasket, or \"Choose Columns\" in Meds & Orders section of the refill encounter.       Last Written Prescription Date:  1/21/19  Last Fill Quantity: 90 tab,  # refills: 0   Last office visit: 1/21/2019 with prescribing provider:  Adeola   Future Office Visit:           "

## 2019-04-17 ENCOUNTER — MYC MEDICAL ADVICE (OUTPATIENT)
Dept: FAMILY MEDICINE | Facility: CLINIC | Age: 34
End: 2019-04-17

## 2019-04-17 ASSESSMENT — ANXIETY QUESTIONNAIRES
GAD7 TOTAL SCORE: 4
GAD7 TOTAL SCORE: 4
7. FEELING AFRAID AS IF SOMETHING AWFUL MIGHT HAPPEN: NOT AT ALL
1. FEELING NERVOUS, ANXIOUS, OR ON EDGE: SEVERAL DAYS
5. BEING SO RESTLESS THAT IT IS HARD TO SIT STILL: SEVERAL DAYS
4. TROUBLE RELAXING: SEVERAL DAYS
3. WORRYING TOO MUCH ABOUT DIFFERENT THINGS: NOT AT ALL
6. BECOMING EASILY ANNOYED OR IRRITABLE: SEVERAL DAYS
GAD7 TOTAL SCORE: 4
2. NOT BEING ABLE TO STOP OR CONTROL WORRYING: NOT AT ALL
7. FEELING AFRAID AS IF SOMETHING AWFUL MIGHT HAPPEN: NOT AT ALL

## 2019-04-17 ASSESSMENT — PATIENT HEALTH QUESTIONNAIRE - PHQ9
SUM OF ALL RESPONSES TO PHQ QUESTIONS 1-9: 8
10. IF YOU CHECKED OFF ANY PROBLEMS, HOW DIFFICULT HAVE THESE PROBLEMS MADE IT FOR YOU TO DO YOUR WORK, TAKE CARE OF THINGS AT HOME, OR GET ALONG WITH OTHER PEOPLE: SOMEWHAT DIFFICULT
SUM OF ALL RESPONSES TO PHQ QUESTIONS 1-9: 8

## 2019-04-18 ENCOUNTER — E-VISIT (OUTPATIENT)
Dept: FAMILY MEDICINE | Facility: CLINIC | Age: 34
End: 2019-04-18
Payer: COMMERCIAL

## 2019-04-18 DIAGNOSIS — F32.1 MODERATE MAJOR DEPRESSION (H): ICD-10-CM

## 2019-04-18 DIAGNOSIS — F41.1 GAD (GENERALIZED ANXIETY DISORDER): ICD-10-CM

## 2019-04-18 PROCEDURE — 99444 ZZC PHYSICIAN ONLINE EVALUATION & MANAGEMENT SERVICE: CPT | Performed by: NURSE PRACTITIONER

## 2019-04-18 RX ORDER — CITALOPRAM HYDROBROMIDE 20 MG/1
20 TABLET ORAL DAILY
Qty: 90 TABLET | OUTPATIENT
Start: 2019-04-18

## 2019-04-18 ASSESSMENT — ANXIETY QUESTIONNAIRES
4. TROUBLE RELAXING: SEVERAL DAYS
2. NOT BEING ABLE TO STOP OR CONTROL WORRYING: NOT AT ALL
6. BECOMING EASILY ANNOYED OR IRRITABLE: MORE THAN HALF THE DAYS
GAD7 TOTAL SCORE: 4
7. FEELING AFRAID AS IF SOMETHING AWFUL MIGHT HAPPEN: NOT AT ALL
1. FEELING NERVOUS, ANXIOUS, OR ON EDGE: SEVERAL DAYS
GAD7 TOTAL SCORE: 6
5. BEING SO RESTLESS THAT IT IS HARD TO SIT STILL: SEVERAL DAYS
GAD7 TOTAL SCORE: 6
7. FEELING AFRAID AS IF SOMETHING AWFUL MIGHT HAPPEN: NOT AT ALL
GAD7 TOTAL SCORE: 6
3. WORRYING TOO MUCH ABOUT DIFFERENT THINGS: SEVERAL DAYS

## 2019-04-18 ASSESSMENT — PATIENT HEALTH QUESTIONNAIRE - PHQ9
SUM OF ALL RESPONSES TO PHQ QUESTIONS 1-9: 6
SUM OF ALL RESPONSES TO PHQ QUESTIONS 1-9: 6
10. IF YOU CHECKED OFF ANY PROBLEMS, HOW DIFFICULT HAVE THESE PROBLEMS MADE IT FOR YOU TO DO YOUR WORK, TAKE CARE OF THINGS AT HOME, OR GET ALONG WITH OTHER PEOPLE: SOMEWHAT DIFFICULT
SUM OF ALL RESPONSES TO PHQ QUESTIONS 1-9: 8

## 2019-04-18 NOTE — TELEPHONE ENCOUNTER
Routing refill request to provider for review/approval because:  PHQ >5. 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 He is currently on full dose.     PHQ-9 SCORE 4/17/2019   PHQ-9 Total Score MyChart 8 (Mild depression)   PHQ-9 Total Score 8     MALISSA-7 SCORE 1/21/2019 4/17/2019   Total Score - 4 (minimal anxiety)   Total Score 20 4      TRIPP SanonN, RN

## 2019-04-19 RX ORDER — CITALOPRAM HYDROBROMIDE 40 MG/1
40 TABLET ORAL DAILY
Qty: 90 TABLET | Refills: 0 | Status: SHIPPED | OUTPATIENT
Start: 2019-04-19 | End: 2019-06-28

## 2019-04-19 ASSESSMENT — PATIENT HEALTH QUESTIONNAIRE - PHQ9: SUM OF ALL RESPONSES TO PHQ QUESTIONS 1-9: 6

## 2019-04-19 ASSESSMENT — ANXIETY QUESTIONNAIRES: GAD7 TOTAL SCORE: 6

## 2019-06-27 ENCOUNTER — E-VISIT (OUTPATIENT)
Dept: FAMILY MEDICINE | Facility: CLINIC | Age: 34
End: 2019-06-27
Payer: COMMERCIAL

## 2019-06-27 DIAGNOSIS — F32.1 MODERATE MAJOR DEPRESSION (H): ICD-10-CM

## 2019-06-27 DIAGNOSIS — F41.1 GAD (GENERALIZED ANXIETY DISORDER): ICD-10-CM

## 2019-06-27 PROCEDURE — 99444 ZZC PHYSICIAN ONLINE EVALUATION & MANAGEMENT SERVICE: CPT | Performed by: NURSE PRACTITIONER

## 2019-06-28 RX ORDER — CITALOPRAM HYDROBROMIDE 20 MG/1
20 TABLET ORAL DAILY
Qty: 90 TABLET | Refills: 1 | Status: SHIPPED | OUTPATIENT
Start: 2019-06-28 | End: 2020-07-14

## 2019-07-13 ENCOUNTER — ANCILLARY PROCEDURE (OUTPATIENT)
Dept: GENERAL RADIOLOGY | Facility: CLINIC | Age: 34
End: 2019-07-13
Attending: INTERNAL MEDICINE
Payer: COMMERCIAL

## 2019-07-13 ENCOUNTER — OFFICE VISIT (OUTPATIENT)
Dept: PEDIATRICS | Facility: CLINIC | Age: 34
End: 2019-07-13
Payer: COMMERCIAL

## 2019-07-13 VITALS
OXYGEN SATURATION: 99 % | SYSTOLIC BLOOD PRESSURE: 118 MMHG | DIASTOLIC BLOOD PRESSURE: 75 MMHG | WEIGHT: 263.6 LBS | TEMPERATURE: 98.4 F | HEART RATE: 58 BPM | BODY MASS INDEX: 32.09 KG/M2

## 2019-07-13 DIAGNOSIS — M54.42 CHRONIC BILATERAL LOW BACK PAIN WITH LEFT-SIDED SCIATICA: ICD-10-CM

## 2019-07-13 DIAGNOSIS — G89.29 CHRONIC BILATERAL LOW BACK PAIN WITH LEFT-SIDED SCIATICA: ICD-10-CM

## 2019-07-13 PROCEDURE — 99214 OFFICE O/P EST MOD 30 MIN: CPT | Performed by: INTERNAL MEDICINE

## 2019-07-13 PROCEDURE — 72100 X-RAY EXAM L-S SPINE 2/3 VWS: CPT | Performed by: RADIOLOGY

## 2019-07-13 RX ORDER — CYCLOBENZAPRINE HCL 10 MG
10 TABLET ORAL 2 TIMES DAILY PRN
Qty: 20 TABLET | Refills: 0 | Status: SHIPPED | OUTPATIENT
Start: 2019-07-13 | End: 2019-08-02

## 2019-07-13 ASSESSMENT — PAIN SCALES - GENERAL: PAINLEVEL: EXTREME PAIN (9)

## 2019-07-13 NOTE — PROGRESS NOTES
Subjective     John Dang is a 34 year old male who presents to clinic today for the following health issues:    HPI   Back Pain     Duration: ongoing last 20 years, worse in months to last year and flared up yesterday morning        Specific cause: none    Description:   Location of pain: low back bilateral  Character of pain: dull ache and burning  Pain radiation:radiates into the right buttocks, radiates into the right leg, radiates into the right foot, radiates into the left buttocks, radiates into the left leg and radiates into the left foot  New numbness or weakness in legs, not attributed to pain: Not sure, Patient reports limited flexibility and range of motion    Intensity: Currently 9/10    History:   Pain interferes with job: YES  History of back problems: no prior back problems  Any previous MRI or X-rays: None  Sees a specialist for back pain:  No    Therapies tried without relief: Tylenol, Ibuprofen, Tizanidine, Meloxicam - Took this morning but no relief    Alleviating factors:   Improved by: none      Precipitating factors:  Worsened by: Bending, Sitting and Walking        Accompanying Signs & Symptoms:  Risk of Fracture:  None  Risk of Cauda Equina:  None  Risk of Infection:  None  Risk of Cancer:  None  Risk of Ankylosing Spondylitis:  Onset at age <35, male, AND morning back stiffness. no       34-year-old gentleman comes in complaining of low back pain.  The pain wraps around to his hips.  Denies radiation to lower extremity.  Gives history of having low back strain off and on for a number of years but in the last 6 months is been worse.  His work involves mostly sitting at a desk and occasionally some light lifting.  Car rides and sitting in plane is the worst.  Usually he can walk off the low back pain.  In the last 2 days he has had more symptoms including stiffness of his low back.             Patient Active Problem List   Diagnosis     CARDIOVASCULAR SCREENING; LDL GOAL LESS THAN  160     MALISSA (generalized anxiety disorder)     Moderate major depression (H)     Chronic bilateral low back pain with left-sided sciatica     Past Surgical History:   Procedure Laterality Date     SOFT TISSUE SURGERY         Social History     Tobacco Use     Smoking status: Never Smoker     Smokeless tobacco: Never Used   Substance Use Topics     Alcohol use: Yes     Alcohol/week: 1.0 oz     Types: 2 Standard drinks or equivalent per week     Comment: occ.     Family History   Problem Relation Age of Onset     Diabetes Mother      Arthritis Mother      Depression Mother      Arthritis Father      Arthritis Maternal Grandmother      Eye Disorder Paternal Grandmother          Current Outpatient Medications   Medication Sig Dispense Refill     citalopram (CELEXA) 20 MG tablet Take 1 tablet (20 mg) by mouth daily 90 tablet 1     cyclobenzaprine (FLEXERIL) 10 MG tablet Take 1 tablet (10 mg) by mouth 2 times daily as needed for muscle spasms 20 tablet 0     No Known Allergies      Reviewed and updated as needed this visit by Provider  Tobacco  Allergies  Meds  Problems  Med Hx  Surg Hx  Fam Hx         Review of Systems   ROS COMP: Constitutional, HEENT, cardiovascular, pulmonary, GI, , musculoskeletal, neuro, skin, endocrine and psych systems are negative, except as otherwise noted.      Objective    /75 (BP Location: Right arm, Patient Position: Sitting, Cuff Size: Adult Large)   Pulse 58   Temp 98.4  F (36.9  C) (Oral)   Wt 119.6 kg (263 lb 9.6 oz)   SpO2 99%   BMI 32.09 kg/m    Body mass index is 32.09 kg/m .  Physical Exam   GENERAL: healthy, alert and no distress  ABDOMEN: soft, nontender, no hepatosplenomegaly, no masses and bowel sounds normal  MS: no spinal tenderness. Spasm of paravertebral muscles of spine. No SI joint tenderness  NEURO: Normal strength and tone, mentation intact and speech normal. Strength grade 5/5 legs.  BACK: no CVA tenderness, no paralumbar tenderness    Diagnostic Test  "Results:  Labs reviewed in Epic    X-ray of the lumbar spine performed.  I reviewed it myself.  The x-ray shows normal alignment and essentially normal spine.    Assessment & Plan     1.  Low back strain with recurrent symptoms.  Recommend Flexeril 10 mg twice daily as needed and ibuprofen 600 mg 3-4 times a day until symptoms resolve.  Recommend physical therapy.  Recommend that he would need to do some long-term therapy at home to prevent the flareups.     BMI:   Estimated body mass index is 32.09 kg/m  as calculated from the following:    Height as of 1/21/19: 1.93 m (6' 4\").    Weight as of this encounter: 119.6 kg (263 lb 9.6 oz).               No follow-ups on file.    Aayush Ortiz MD  [facility]      "

## 2019-07-19 DIAGNOSIS — F32.1 MODERATE MAJOR DEPRESSION (H): ICD-10-CM

## 2019-07-19 DIAGNOSIS — F41.1 GAD (GENERALIZED ANXIETY DISORDER): ICD-10-CM

## 2019-07-19 RX ORDER — CITALOPRAM HYDROBROMIDE 20 MG/1
20 TABLET ORAL DAILY
Qty: 90 TABLET | Refills: 1 | OUTPATIENT
Start: 2019-07-19

## 2019-07-19 NOTE — TELEPHONE ENCOUNTER
"Requested Prescriptions   Pending Prescriptions Disp Refills     citalopram (CELEXA) 20 MG tablet 90 tablet 1     Sig: Take 1 tablet (20 mg) by mouth daily       SSRIs Protocol Failed - 7/19/2019 11:03 AM        Failed - PHQ-9 score less than 5 in past 6 months     Please review last PHQ-9 score.           Passed - Medication is active on med list        Passed - Patient is age 18 or older        Passed - Recent (6 mo) or future (30 days) visit within the authorizing provider's specialty     Patient had office visit in the last 6 months or has a visit in the next 30 days with authorizing provider or within the authorizing provider's specialty.  See \"Patient Info\" tab in inbasket, or \"Choose Columns\" in Meds & Orders section of the refill encounter.            Last Written Prescription Date:  6/28/19  Last Fill Quantity: 90,  # refills: 1   Last office visit: 1/21/2019 with prescribing provider:  Aleshia   Future Office Visit:      "

## 2019-08-05 ENCOUNTER — HOSPITAL ENCOUNTER (OUTPATIENT)
Dept: PHYSICAL THERAPY | Facility: CLINIC | Age: 34
Setting detail: THERAPIES SERIES
End: 2019-08-05
Attending: INTERNAL MEDICINE
Payer: COMMERCIAL

## 2019-08-05 DIAGNOSIS — M54.42 CHRONIC BILATERAL LOW BACK PAIN WITH LEFT-SIDED SCIATICA: ICD-10-CM

## 2019-08-05 DIAGNOSIS — G89.29 CHRONIC BILATERAL LOW BACK PAIN WITH LEFT-SIDED SCIATICA: ICD-10-CM

## 2019-08-05 PROCEDURE — 97140 MANUAL THERAPY 1/> REGIONS: CPT | Mod: GP | Performed by: PHYSICAL THERAPIST

## 2019-08-05 PROCEDURE — 97162 PT EVAL MOD COMPLEX 30 MIN: CPT | Mod: GP | Performed by: PHYSICAL THERAPIST

## 2019-08-05 PROCEDURE — 97110 THERAPEUTIC EXERCISES: CPT | Mod: GP | Performed by: PHYSICAL THERAPIST

## 2019-08-05 NOTE — PROGRESS NOTES
08/05/19 1600   General Information   Type of Visit Initial OP Ortho PT Evaluation   Start of Care Date 08/05/19   Referring Physician Aayush Ortiz MD   Patient/Family Goals Statement To gain the skills I need so I can work on this on my own and not have a back that hurts.   Orders Evaluate and Treat   Date of Order 07/13/19   Certification Required? No   Medical Diagnosis B LBP/ L sciatica   Body Part(s)   Body Part(s) Lumbar Spine/SI   Presentation and Etiology   Pertinent history of current problem (include personal factors and/or comorbidities that impact the POC) Pt presents w/ L > R LBP x 20 years intermittently.  Pt notes this episode flared up about 1 year ago w/ prolonged sitting but increased 2 weeks ago.  Pain @ best 2/10,  @ worst 9/10.  Pt notes w/ sitting pain can radiate into tailbone.  Pt notes a couple episodes of pain shooting down L LE to lower leg--short duration.   No numbness/tingling.  Negative LE weakness/ bowel / bladder.   Negative cough/ sneeze.  xray in chart:  Mild convex right curvature of thoracolumbar spine.   Meds:  flexeril ibuprofen.  No injections.   PMHX:  recurrent LBP,  depression.   Mod: wt/ reoccuring symptoms/ work tasks   Impairments A. Pain;D. Decreased ROM;E. Decreased flexibility;F. Decreased strength and endurance   Pain quality D. Burning;F. Stabbing   Pain exacerbation comment sitting varies 10 min to 2 hours.   Lift from floor unable currently.  Carrying 5# or more.  Bending fwd ie) putting on shoes/ socks.   Difficulty getting comfortable to sleep---now w/ flexeril sleeping thru the night ---without meds waking every hour.     Pain/symptoms eased by B. Walking  (prescription meds; avoiding sitting on tailbone)   Progression of symptoms since onset: Worsened  (flared 2 weeks ago and again over the past weekend)   Prior Level of Function   Functional Level Prior Comment no regular exercise.  Does yardwork   Current Level of Function   Patient role/employment  history A. 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 No   Have you fallen and had an injury in the past year? No   Is patient a fall risk? No   Abuse Screen (yes response referral indicated)   Feels Unsafe at Home or Work/School no   Feels Threatened by Someone no   Does Anyone Try to Keep You From Having Contact with Others or Doing Things Outside Your Home? no   Lumbar Spine/SI Objective Findings   Posture Decreased lumbar lordosis.  R PSIS/ crest lower.  R LE longer supine.    Gait/Locomotion WNL  Heel/toe walk negative.     Flexion ROM 50% (to knees) increased pain   Extension ROM 40% w/ pain on L side   Right Side Bending ROM 80% w/ L sided pain   Left Side Bending ROM 90%   Pelvic Screen FB test unable to fully assess due to limited mobility.     Hip Screen PROM ER R 43*, L 40*;  IR 28* B.   R FADIR + for buttock pain,  L neg.  B scour/ RODGER negative   Hip Flexion (L2) Strength B 5/5   Hip Abduction Strength B 4/5   Hip Extension Strength B 4-/5   Knee Flexion Strength B 5/5   Knee Extension (L3) Strength B 5/5   Ankle Dorsiflexion (L4) Strength B 5/5   Great Toe Extension (L5) Strength B 5/5   Hamstring Flexibility moderate tightness B    Hip Flexor Flexibility moderate tightness B    SLR neg B   Garth Test neg B    Crossover SLR neg B      Repeated Extension Prone QUIN: just to L of midline LBP 3-4/10.  PPU  40% of motion notes tightness   Slump Test neg B    Segmental Mobility Mod + tenderness and mild hypomobility L4/5,  mild tenderness L3.  ERSR L4/ L5   Palpation Mild tenderness R psoas and B iliacus otherwise neg.    Planned Therapy Interventions   Planned Therapy Interventions manual therapy;neuromuscular re-education;ROM;strengthening;stretching  (body mechanics)   Planned Modality Interventions   Planned Modality Interventions Electrical  stimulation   Clinical Impression   Criteria for Skilled Therapeutic Interventions Met yes, treatment indicated   PT Diagnosis chronic reoccuring LBP   Influenced by the following impairments pain, stiffness, decreased strength   Functional limitations due to impairments sitting, sleeping, lifting, carrying, putting on shoes/ socks   Clinical Presentation Evolving/Changing   Clinical Presentation Rationale recent flareup in symptoms last 2 weeks   Clinical Decision Making (Complexity) Moderate complexity   Therapy Frequency 2 times/Week   Predicted Duration of Therapy Intervention (days/wks) 2 weeks then 1X/wk X 4 = 8 visits   Risk & Benefits of therapy have been explained Yes   Patient, Family & other staff in agreement with plan of care Yes   Education Assessment   Barriers to Learning No barriers   Ortho Goal 1   Goal Description 1.  Pt will be able to bend to put on socks/shoes w/ LBP no > 3/10   Target Date 08/26/19   Ortho Goal 2   Goal Description 2. Pt will be able to consistently sit 30-45 min w/ LBP no > 3/10   Target Date 09/24/19   Ortho Goal 3   Goal Description 3.  Pt will be able to lift/ carry up to 20 # w/ LBP no > 3/10   Target Date 09/24/19   Ortho Goal 4   Goal Description 4.  Pt will wake no > 1X/night w/o flexeril   Target Date 09/24/19   Ortho Goal 5   Goal Description 5. Pt will be independent and consistent w/HEP including a walking program and have improved awareness of body mechanics   Target Date 09/24/19   Total Evaluation Time   PT Rita Moderate Complexity Minutes ([id number]) 30     Thank you for this referral,    Mine Parry, PT,  CEAS   #[id number]  [facility]  [phone]

## 2019-08-13 ENCOUNTER — HOSPITAL ENCOUNTER (OUTPATIENT)
Dept: PHYSICAL THERAPY | Facility: CLINIC | Age: 34
Setting detail: THERAPIES SERIES
End: 2019-08-13
Attending: INTERNAL MEDICINE
Payer: COMMERCIAL

## 2019-08-13 PROCEDURE — 97110 THERAPEUTIC EXERCISES: CPT | Mod: GP | Performed by: PHYSICAL THERAPIST

## 2019-09-17 NOTE — PROGRESS NOTES
"   OUTPATIENT PHYSICAL THERAPY DISCHARGE SUMMARY   Aayush Ortiz MD 8/5/19 to 08/13/19 1500   Signing Clinician's Name / Credentials   Signing clinician's name / credentials Mine Meyerliliana, PT [id number]   Session Number   Session Number 2 [facility]   Ortho Goal 1   Goal Description 1.  Pt will be able to bend to put on socks/shoes w/ LBP no > 3/10   Target Date 08/26/19   Ortho Goal 2   Goal Description 2. Pt will be able to consistently sit 30-45 min w/ LBP no > 3/10   Target Date 09/24/19   Ortho Goal 3   Goal Description 3.  Pt will be able to lift/ carry up to 20 # w/ LBP no > 3/10   Target Date 09/24/19   Ortho Goal 4   Goal Description 4.  Pt will wake no > 1X/night w/o flexeril.  8/13/19  2 out of 10 days took flexeril--pt is able to sleep thru the night MET   Target Date 09/24/19   Ortho Goal 5   Goal Description 5. Pt will be independent and consistent w/HEP including a walking program and have improved awareness of body mechanics.  8/13/19 MET   Target Date 09/24/19   Subjective Report   Subjective Report Pt states he is feeling alot better.  Pt has been doing the ex daily.  LBP intermittent 2/10.  Pt is able to walk 1 mile 3-4X.     Objective Measures   Objective Measure PSIS / crest level . Neg FB test.   Details LROM flex 75% no complaints   Objective Measure Neg ERS/ FRS    System Outcome Measures   Outcome Measures Artemio 2 low   Therapeutic Procedure/exercise   Treatment Detail  Standing  hip flex stretch. Piriformis stretch w/ ankle pull 30\" B.   Bridges w/ RTB X 10.  Clam w/ RTB X 10 B.  Seated HS stretch 30\" B.  seated TA sets.    Prone plank  (17\", 14\")   4pt LE lift X 5 B .   Provided body mechanics handout and reviewed w/ pt.   Plan   Home program ex as above, heat/ice, walking   Plan  Pt to cont on own w/ HEP, discharge from physical therapy.   Comments   Comments Limited comments towards goals as noted above as pt only seen twice in clinic     "

## 2019-09-17 NOTE — ADDENDUM NOTE
Encounter addended by: Mine Parry, PT on: 9/17/2019 1:36 PM   Actions taken: Sign clinical note, Flowsheet accepted, Episode resolved

## 2020-03-02 ENCOUNTER — HEALTH MAINTENANCE LETTER (OUTPATIENT)
Age: 35
End: 2020-03-02

## 2020-06-17 ENCOUNTER — OFFICE VISIT (OUTPATIENT)
Dept: FAMILY MEDICINE | Facility: CLINIC | Age: 35
End: 2020-06-17
Payer: COMMERCIAL

## 2020-06-17 VITALS
WEIGHT: 295 LBS | BODY MASS INDEX: 35.92 KG/M2 | HEART RATE: 88 BPM | OXYGEN SATURATION: 98 % | DIASTOLIC BLOOD PRESSURE: 88 MMHG | HEIGHT: 76 IN | TEMPERATURE: 97.7 F | SYSTOLIC BLOOD PRESSURE: 132 MMHG

## 2020-06-17 DIAGNOSIS — G89.29 CHRONIC BILATERAL LOW BACK PAIN WITH RIGHT-SIDED SCIATICA: Primary | ICD-10-CM

## 2020-06-17 DIAGNOSIS — M54.41 CHRONIC BILATERAL LOW BACK PAIN WITH RIGHT-SIDED SCIATICA: Primary | ICD-10-CM

## 2020-06-17 PROCEDURE — 99213 OFFICE O/P EST LOW 20 MIN: CPT | Performed by: NURSE PRACTITIONER

## 2020-06-17 RX ORDER — PREDNISONE 20 MG/1
40 TABLET ORAL DAILY
Qty: 10 TABLET | Refills: 0 | Status: SHIPPED | OUTPATIENT
Start: 2020-06-17 | End: 2020-06-22

## 2020-06-17 RX ORDER — CYCLOBENZAPRINE HCL 10 MG
10 TABLET ORAL 3 TIMES DAILY PRN
Qty: 30 TABLET | Refills: 1 | Status: SHIPPED | OUTPATIENT
Start: 2020-06-17 | End: 2022-05-09

## 2020-06-17 ASSESSMENT — MIFFLIN-ST. JEOR: SCORE: 2364.61

## 2020-06-17 NOTE — PROGRESS NOTES
"Subjective     John Dang is a 35 year old male who presents to clinic today for the following health issues:    HPI   Back Pain       Duration: chronic low back pain    Started this episode about two weeks ago        Specific cause: none    Description:   Location of pain: low back bilateral-  Starts in the middle and wraps around both hips (right worse than left)  Character of pain: sharp and constant  Pain radiation:radiates into the right buttocks and radiates into the left buttocks  New numbness or weakness in legs, not attributed to pain:  no     Intensity: moderate    History:   Pain interferes with job: YES  History of back problems: recurrent self limited episodes of low back pain in the past  Any previous MRI or X-rays: Yes- at [facility].  Date 7/13/2019- lumbar xray  Sees a specialist for back pain:  No  Therapies tried without relief: acetaminophen (Tylenol), muscle relaxants and NSAIDs    Alleviating factors:   Improved by: Physical Therapy last year     Precipitating factors:  Worsened by: Bending, Standing and Sitting          Accompanying Signs & Symptoms:  Risk of Fracture:  None  Risk of Cauda Equina:  None  Risk of Infection:  None  Risk of Cancer:  None  Risk of Ankylosing Spondylitis:  Onset at age <35, male, AND morning back stiffness. no                    Reviewed and updated as needed this visit by Provider         Review of Systems   Constitutional, HEENT, cardiovascular, pulmonary, gi and gu systems are negative, except as otherwise noted.      Objective    /88 (BP Location: Right arm, Cuff Size: Adult Large)   Pulse 88   Temp 97.7  F (36.5  C) (Tympanic)   Ht 1.93 m (6' 4\")   Wt 133.8 kg (295 lb)   SpO2 98%   BMI 35.91 kg/m    Body mass index is 35.91 kg/m .  Physical Exam   GENERAL: healthy, alert and no distress  Comprehensive back pain exam:  No tenderness, Range of motion not limited by pain, Lower extremity strength functional and equal on both sides, Lower " "extremity reflexes within normal limits bilaterally, Lower extremity sensation normal and equal on both sides and Straight leg positive on  right, indicating possible ipsilateral radiculopathy            Assessment & Plan       ICD-10-CM    1. Chronic bilateral low back pain with right-sided sciatica  M54.41 PHYSICAL THERAPY REFERRAL    G89.29 predniSONE (DELTASONE) 20 MG tablet     cyclobenzaprine (FLEXERIL) 10 MG tablet     BMI:   Estimated body mass index is 35.91 kg/m  as calculated from the following:    Height as of this encounter: 1.93 m (6' 4\").    Weight as of this encounter: 133.8 kg (295 lb).   Weight management plan: Discussed healthy diet and exercise guidelines    Patient Instructions   Prednisone (steroid) 2 tablets once daily for 5 days.  Tylenol 1000 mg every 8 hours for pain.  Cyclobenzaprine one tablet every 8 hours if needed for muscle tightness/spasms    When prednisone is completed, you may use ibuprofen 600 mg every 6 hours as needed for pain.    Heat to area for 15-20 minutes several times per day.    Schedule PT.      Thank you for choosing [facility].  You may be receiving an email and/or telephone survey request from [facility] Customer Experience regarding your visit today.  Please take a few minutes to respond to the survey to let us know how we are doing.      If you have questions or concerns, please contact us via [employer]polis or you can contact your care team at [phone].    Our Clinic hours are:  Monday 6:40 am  to 7:00 pm  Tuesday -Friday 6:40 am to 5:00 pm    The [place] outpatient lab hours are:  Monday - Friday 6:10 am to 4:45 pm  Saturdays 7:00 am to 11:00 am  Appointments are required, call [phone]    If you have clinical questions after hours or would like to schedule an appointment,  call the clinic at [phone].        Return in about 4 weeks (around 7/15/2020).    The risks, benefits and treatment options of prescribed medications or other treatments have been " discussed with the patient. The patient verbalized their understanding and should call or follow up if no improvement or if they develop further problems.    [facility]

## 2020-06-17 NOTE — PATIENT INSTRUCTIONS
Prednisone (steroid) 2 tablets once daily for 5 days.  Tylenol 1000 mg every 8 hours for pain.  Cyclobenzaprine one tablet every 8 hours if needed for muscle tightness/spasms    When prednisone is completed, you may use ibuprofen 600 mg every 6 hours as needed for pain.    Heat to area for 15-20 minutes several times per day.    Schedule PT.      Thank you for choosing [facility]  You may be receiving an email and/or telephone survey request from [facility] Customer Experience regarding your visit today.  Please take a few minutes to respond to the survey to let us know how we are doing.      If you have questions or concerns, please contact us via Lightbox or you can contact your care team at [phone].    Our Clinic hours are:  Monday 6:40 am  to 7:00 pm  Tuesday -Friday 6:40 am to 5:00 pm    The [place] outpatient lab hours are:  Monday - Friday 6:10 am to 4:45 pm  Saturdays 7:00 am to 11:00 am  Appointments are required, call [phone]    If you have clinical questions after hours or would like to schedule an appointment,  call the clinic at [phone].

## 2020-06-23 ENCOUNTER — HOSPITAL ENCOUNTER (OUTPATIENT)
Dept: PHYSICAL THERAPY | Facility: CLINIC | Age: 35
Setting detail: THERAPIES SERIES
End: 2020-06-23
Attending: NURSE PRACTITIONER
Payer: COMMERCIAL

## 2020-06-23 DIAGNOSIS — M54.41 CHRONIC BILATERAL LOW BACK PAIN WITH RIGHT-SIDED SCIATICA: ICD-10-CM

## 2020-06-23 DIAGNOSIS — G89.29 CHRONIC BILATERAL LOW BACK PAIN WITH RIGHT-SIDED SCIATICA: ICD-10-CM

## 2020-06-23 PROCEDURE — 97161 PT EVAL LOW COMPLEX 20 MIN: CPT | Mod: GP | Performed by: PHYSICAL THERAPIST

## 2020-06-23 PROCEDURE — 97140 MANUAL THERAPY 1/> REGIONS: CPT | Mod: GP | Performed by: PHYSICAL THERAPIST

## 2020-06-23 PROCEDURE — 97110 THERAPEUTIC EXERCISES: CPT | Mod: GP | Performed by: PHYSICAL THERAPIST

## 2020-06-24 NOTE — PROGRESS NOTES
Physical Therapy Initial Lumbar Evaluation     06/23/20 1400   General Information   Type of Visit Initial OP Ortho PT Evaluation   Start of Care Date 06/23/20   Referring Physician Nela Bravo CNP   Patient/Family Goals Statement Be nice to not have pain anymore, and do it without drugs.   Orders Evaluate and Treat   Date of Order 06/17/20   Certification Required? No   Medical Diagnosis Chronic bilateral low back pain with right-sided sciatica   Surgical/Medical history reviewed Yes   Precautions/Limitations no known precautions/limitations   General Information Comments PMHx: Generally healthy, recurrent LBP,  depression.    Body Part(s)   Body Part(s) Lumbar Spine/SI   Presentation and Etiology   Pertinent history of current problem (include personal factors and/or comorbidities that impact the POC) Pt known to this clinic for similar episode, but worse this time, of LBP. Pain started spontaneously around 6/3/20. No specific mechanism of injury.  States is having pain into (B) buttocks, and changes laterality and severity with activity day to day.  Prednisone taken after MD visit was helpful, but is off that now.  Did get better with last episode of PT, but now has returned.  Went to clinic and was referrerd to PT.   Impairments A. Pain;B. Decreased WB tolerance;D. Decreased ROM;E. Decreased flexibility;F. Decreased strength and endurance;H. Impaired gait  (hip pain if walking wihout supportive shoes)   Functional Limitations perform activities of daily living;perform required work activities;perform desired leisure / sports activities   Symptom Location (B) LB/spine into buttocks and hips   How/Where did it occur From insidious onset   Onset date of current episode/exacerbation 06/17/20   Chronicity Recurrent   Best (/10) 3   Worst (/10) 8   Pain quality A. Sharp;F. Stabbing   Frequency of pain/symptoms A. Constant   Pain/symptoms are: Worse in the morning  (gets better as day goes on)   Pain/symptoms  "exacerbated by A. Sitting;B. Walking;C. Lifting;E. Rest;I. Bending;L. Work tasks   Pain/symptoms eased by C. Rest;E. Changing positions;F. Certain positions;K. Other   Pain eased by comment Prednisone   Progression of symptoms since onset: Worsened   Current / Previous Interventions   Diagnostic Tests:   (none this time around)   Prior Level of Function   Functional Level Prior Comment No regular exercise   Current Level of Function   Current Community Support Family/friend caregiver   Patient role/employment history A. 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 No   Have you fallen and had an injury in the past year? No   Is patient a fall risk? No   Abuse Screen (yes response referral indicated)   Feels Unsafe at Home or Work/School no   Feels Threatened by Someone no   Does Anyone Try to Keep You From Having Contact with Others or Doing Things Outside Your Home? no   Physical Signs of Abuse Present no   Lumbar Spine/SI Objective Findings   Posture Fwd head, decreased lumbar lordosis, (R) IC inferior in static stand   Gait/Locomotion mild increase lateral sway with initial steps, then WNL   Flexion ROM Fingertips to 1 inch above knees, \"pulls\"   Extension ROM to neutral then pain (L) SIJ region   Right Side Bending ROM Fingertips to 1 inch below knee jt line   Left Side Bending ROM Fingertips to knee jt line, but \"pinches\" (L) SIJ region   Pelvic Screen (+) march for decreased mobility (R), (R) ilium posteriorly rotated; transverse torsion with (L) prominent ASIS   Hip Screen neg scour, neg RODGER, neg FADIR   Hip Flexion (L2) Strength 5/5 (B)   Knee Flexion Strength 5/5 (B)   Knee Extension (L3) Strength 5/5 (B)   Ankle Dorsiflexion (L4) Strength 5/5 (B)   Hamstring Flexibility 60* (R), 55* (L)   Hip Flexor Flexibility Tight (B)   SLR neg (B)   Crossover SLR neg (B) "   Slump Test neg   Palpation Tenderness (L) glute med   Observation Mild pain behaviors demo'd of wincing and slow to rise from chair, increased lateral sway with initial amb   Integumentary No anomalies noted   Ankle Plantar Flexion (S1) Strength 5/5 (R), 5-/5 (L)   Piriformis Flexibility Tight (B)   Patellar Tendon Reflexes  WNL   Achilles Tendon Reflexes WNL   Planned Therapy Interventions   Planned Therapy Interventions joint mobilization;manual therapy;motor coordination training;neuromuscular re-education;strengthening;stretching   Planned Modality Interventions   Planned Modality Interventions Cryotherapy;Hot packs   Clinical Impression   Criteria for Skilled Therapeutic Interventions Met yes, treatment indicated   PT Diagnosis chronic reoccuring LBP   Influenced by the following impairments pain, decreased flexiblity, poor use of core muscles   Functional limitations due to impairments static standing, walking longer distances, lifting heavier wts, sleeping full nite   Clinical Presentation Stable/Uncomplicated   Clinical Presentation Rationale recent flare of chronic LBP, predictable pain patterns, minimal but present comorbidities   Clinical Decision Making (Complexity) Low complexity   Therapy Frequency 1 time/week   Predicted Duration of Therapy Intervention (days/wks) 6 weeks for up to 6 sessions   Risk & Benefits of therapy have been explained Yes   Patient, Family & other staff in agreement with plan of care Yes   Clinical Impression Comments Pt presents with flare up of chronic LBP, onset about 6/3/20 without incident.  Pt has been seen by PT in past 1 year and had improvement, but did not follow thru with exer program.  Pt could benefit from skilled PT to improve flexbility, improve core strength and posture habits, to meet set goals.   Education Assessment   Preferred Learning Style Listening;Reading;Demonstration;Pictures/video   Barriers to Learning No barriers   Ortho Goal 1   Goal Description  1)Pt will report pain with standing for ADLs (brushing teeth, etc) at 2/10 in 3 weeks.    Target Date 07/14/20   Goal Identifier STG   Ortho Goal 2   Goal Description 2)Pt will perform sit to stand after sitting prolonged period at work with 2/10 or less LBP in 3 weeks.    Target Date 07/14/20   Goal Identifier STG   Ortho Goal 3   Goal Description 3)Pt will lift/carry up to 20# for ADLs with 2/10 or less LBP in 6 weeks.    Target Date 08/04/20   Goal Identifier LTG   Ortho Goal 4   Goal Description 4)Pt will report no LBP with static standing for 15 mins, in 6 weeks.    Target Date 08/04/20   Goal Identifier LTG   Ortho Goal 5   Goal Description 5)Pt will be indep in HEP to prevent return of LBP in 6 weeks.   Target Date 08/04/20   Goal Identifier LTG   Total Evaluation Time   PT Rita Low Complexity Minutes (65172) 25   Thank you for the referral of this patient.  Odette Benedict, PT, MA  #[id number]

## 2020-07-13 DIAGNOSIS — F41.1 GAD (GENERALIZED ANXIETY DISORDER): ICD-10-CM

## 2020-07-13 DIAGNOSIS — F32.1 MODERATE MAJOR DEPRESSION (H): ICD-10-CM

## 2020-07-13 NOTE — LETTER
[facility]  [address] [place] [address]  Phone: [phone]       July 14, 2020         John Dang  [address] [address]            Dear John:    We are concerned about your health care.  We recently provided you with medication refills.  Many medications require routine follow-up with your doctor. This can be a virtual visit    Your prescription(s) have been refilled for 1 month so you may have time for the above noted follow-up. Please call to schedule soon so we can assure you have an appointment before your next refills are needed.    Thank you,      Dheeraj Monk MD / Helga NAIR RN

## 2020-07-14 RX ORDER — CITALOPRAM HYDROBROMIDE 20 MG/1
20 TABLET ORAL DAILY
Qty: 30 TABLET | Refills: 0 | Status: SHIPPED | OUTPATIENT
Start: 2020-07-14 | End: 2020-08-12

## 2020-07-14 NOTE — TELEPHONE ENCOUNTER
Refill for one month.  Needs to make virtual appointment for further refills.  Ricarda Carmichael NP on 7/14/2020 at 10:02 AM

## 2020-07-14 NOTE — TELEPHONE ENCOUNTER
"Requested Prescriptions   Pending Prescriptions Disp Refills     citalopram (CELEXA) 20 MG tablet 90 tablet 1     Sig: Take 1 tablet (20 mg) by mouth daily       SSRIs Protocol Failed - 7/13/2020  1:05 PM        Failed - PHQ-9 score less than 5 in past 6 months     Please review last PHQ-9 score.           Passed - Medication is active on med list        Passed - Patient is age 18 or older        Passed - Recent (6 mo) or future (30 days) visit within the authorizing provider's specialty     Patient had office visit in the last 6 months or has a visit in the next 30 days with authorizing provider or within the authorizing provider's specialty.  See \"Patient Info\" tab in inbasket, or \"Choose Columns\" in Meds & Orders section of the refill encounter.               Last Written Prescription Date:  6/28/19  Last Fill Quantity: 90,  # refills: 1   Last office visit: 6/17/2020 with prescribing provider:  Aleshia   Future Office Visit:            "

## 2020-08-11 NOTE — PROGRESS NOTES
Physical Therapy Discharge Summary    John TAN Alton has completed the ordered physical therapy evaluation. Treatment recommendations were made, but pt has not returned for any further recommended PT sessions.  Pt was unable to be re-assessed, and present status is unknown.    Please contact me with any questions or concerns.  Thank you for your referral.

## 2020-08-12 ENCOUNTER — TELEPHONE (OUTPATIENT)
Dept: FAMILY MEDICINE | Facility: CLINIC | Age: 35
End: 2020-08-12

## 2020-08-12 DIAGNOSIS — F41.1 GAD (GENERALIZED ANXIETY DISORDER): ICD-10-CM

## 2020-08-12 DIAGNOSIS — F32.1 MODERATE MAJOR DEPRESSION (H): ICD-10-CM

## 2020-08-12 RX ORDER — CITALOPRAM HYDROBROMIDE 20 MG/1
20 TABLET ORAL DAILY
Qty: 30 TABLET | Refills: 0 | Status: SHIPPED | OUTPATIENT
Start: 2020-08-12 | End: 2020-09-14

## 2020-08-12 NOTE — TELEPHONE ENCOUNTER
Refilled #30.  Please call patient to have him schedule clinic appointment for preventive plus visit.

## 2020-08-12 NOTE — TELEPHONE ENCOUNTER
"Requested Prescriptions   Pending Prescriptions Disp Refills     citalopram (CELEXA) 20 MG tablet 30 tablet 0     Sig: Take 1 tablet (20 mg) by mouth daily       SSRIs Protocol Failed - 8/12/2020  1:48 PM        Failed - PHQ-9 score less than 5 in past 6 months     Please review last PHQ-9 score.           Passed - Medication is active on med list        Passed - Patient is age 18 or older        Passed - Recent (6 mo) or future (30 days) visit within the authorizing provider's specialty     Patient had office visit in the last 6 months or has a visit in the next 30 days with authorizing provider or within the authorizing provider's specialty.  See \"Patient Info\" tab in inbasket, or \"Choose Columns\" in Meds & Orders section of the refill encounter.                 "

## 2020-09-11 DIAGNOSIS — F32.1 MODERATE MAJOR DEPRESSION (H): ICD-10-CM

## 2020-09-11 DIAGNOSIS — F41.1 GAD (GENERALIZED ANXIETY DISORDER): ICD-10-CM

## 2020-09-11 NOTE — TELEPHONE ENCOUNTER
Per 8-12-20 refill request for Citalopram:     Dheeraj Monk MD        8/12/20 2:28 PM   Note      Refilled #30.  Please call patient to have him schedule clinic appointment for preventive plus visit.          Message left to return call.     Needs to be seen per Dr. Monk, see above copied/pasted note, has  Been given abdirahman refill and was notified of need to be seen, but did not schedule follow up appointment.     Faina Harris RN

## 2020-09-11 NOTE — TELEPHONE ENCOUNTER
"Requested Prescriptions   Pending Prescriptions Disp Refills     citalopram (CELEXA) 20 MG tablet [Pharmacy Med Name: CITALOPRAM HBR 20 MG TABLET] 30 tablet 0     Sig: TAKE 1 TABLET (20 MG) BY MOUTH DAILY NEEDS CLINIC FOLLOW UP.       SSRIs Protocol Failed - 9/11/2020 12:39 AM        Failed - PHQ-9 score less than 5 in past 6 months     Please review last PHQ-9 score.           Passed - Medication is active on med list        Passed - Patient is age 18 or older        Passed - Recent (6 mo) or future (30 days) visit within the authorizing provider's specialty     Patient had office visit in the last 6 months or has a visit in the next 30 days with authorizing provider or within the authorizing provider's specialty.  See \"Patient Info\" tab in inbasket, or \"Choose Columns\" in Meds & Orders section of the refill encounter.               MALISSA-7 SCORE 1/21/2019 4/17/2019 4/18/2019   Total Score - 4 (minimal anxiety) 6 (mild anxiety)   Total Score 20 4 6     PHQ 1/21/2019 4/17/2019 4/18/2019   PHQ-9 Total Score - 8 6   Q9: Thoughts of better off dead/self-harm past 2 weeks Several days Not at all Not at all     "

## 2020-09-14 RX ORDER — CITALOPRAM HYDROBROMIDE 20 MG/1
20 TABLET ORAL DAILY
Qty: 15 TABLET | Refills: 0 | Status: SHIPPED | OUTPATIENT
Start: 2020-09-14 | End: 2022-08-09

## 2020-09-14 NOTE — TELEPHONE ENCOUNTER
Refilled 15 after he was given abdirahman period refill. He never schedule appointment. He was last seen by this provider in 2018..  Please call patient to have him schedule clinic appointment in 2 weeks.

## 2020-12-14 ENCOUNTER — HEALTH MAINTENANCE LETTER (OUTPATIENT)
Age: 35
End: 2020-12-14

## 2021-03-03 ENCOUNTER — MYC MEDICAL ADVICE (OUTPATIENT)
Dept: FAMILY MEDICINE | Facility: CLINIC | Age: 36
End: 2021-03-03

## 2021-03-04 ENCOUNTER — VIRTUAL VISIT (OUTPATIENT)
Dept: FAMILY MEDICINE | Facility: CLINIC | Age: 36
End: 2021-03-04
Payer: COMMERCIAL

## 2021-03-04 DIAGNOSIS — Z83.2 FAMILY HISTORY OF FACTOR V LEIDEN MUTATION: Primary | ICD-10-CM

## 2021-03-04 PROCEDURE — 99213 OFFICE O/P EST LOW 20 MIN: CPT | Mod: TEL | Performed by: FAMILY MEDICINE

## 2021-03-04 NOTE — PATIENT INSTRUCTIONS
"  Patient Education     Factor V  Does this test have other names?  Factor V assay, clotting factor tests, factor assay   What is this test?  A factor V test is a blood test that checks for a deficiency in a protein known as factor V. Factor V is a protein that helps your blood to clot. Having too little factor V can cause a rare bleeding disorder. Your body has many protein \"clotting factors.\" They are identified by Olvin numerals.   Factor V deficiency is an inherited disorder. It is called an autosomal recessive gene disorder. This means both parents must have the defective gene and pass it on to their children for this to happen.   Why do I need this test?  If you have certain symptoms, such as unexplained or extra bleeding or bruising, it may mean that your blood is not clotting the way it should. You might have a deficiency in one of the clotting factors. You may need a blood test to check for the presence and function of the individual clotting factors to see if you have a deficiency in any of them.   What other tests might I have along with this test?  Along with tests to check for factor V deficiency, your blood may be checked for deficiencies in other clotting factors. Prothrombin time and partial thromboplastin times may also be checked.    What do my test results mean?  Test results may vary depending on your age, gender, health history, the method used for the test, and other things. Your test results may not mean you have a problem. Ask your healthcare provider what your test results mean for you.    In a test of your clotting factors, the results are often given as a percentage. So if you get a result of 100%, it means your factor V is at 100% of its normal value. Levels between 20% and 70%mean a mild factor V deficiency. This usually causes no symptoms. Levels of 1% to 10% are severely low and can lead to major bleeding issues.   How is this test done?  The test is done with a blood sample. A needle " is used to draw blood from a vein in your arm or hand.    Does this test pose any risks?  Having a blood test with a needle carries some risks. These include bleeding, infection, bruising, and feeling lightheaded. When the needle pricks your arm or hand, you may feel a slight sting or pain. Afterward, the site may be sore.    What might affect my test results?  A deficiency of factor V is quite rare (about 1 in 1 million) and can be a genetic disorder passed on from parents to their children. But some clotting factors, including factor V, can decrease because of certain illnesses, such as liver disease, cancers, autoimmune diseases, and a disease called disseminated intravascular coagulation. It can also happen after exposure to some toxins.   How do I get ready for this test?  You don't need to prepare for this test. Tell your healthcare provider about all medicines, herbs, vitamins, and supplements you are taking. This includes medicines that don't need a prescription and any illegal drugs you may use.    [employer] last reviewed this educational content on 10/1/2020    3800-3904 [employer]. All rights reserved. This information is not intended as a substitute for professional medical care. Always follow your healthcare professional's instructions.

## 2021-03-04 NOTE — PROGRESS NOTES
"Nader is a 36 year old who is being evaluated via a billable telephone visit.      What phone number would you like to be contacted at? [phone]  How would you like to obtain your AVS? Elaine    Assessment & Plan     Family history of factor V Leiden mutation  Patient is asymptomatic.  Discussed test is a genetic screening. He concurred to pursue.  Order placed.  Advised of signs of hypercoagulability.   - Factor 5 leiden mutation analysis    Total time; 9 including actual phone encounter with patient, review of chart, and documentation.  Patient Instructions     Patient Education     Factor V  Does this test have other names?  Factor V assay, clotting factor tests, factor assay   What is this test?  A factor V test is a blood test that checks for a deficiency in a protein known as factor V. Factor V is a protein that helps your blood to clot. Having too little factor V can cause a rare bleeding disorder. Your body has many protein \"clotting factors.\" They are identified by Olvin numerals.   Factor V deficiency is an inherited disorder. It is called an autosomal recessive gene disorder. This means both parents must have the defective gene and pass it on to their children for this to happen.   Why do I need this test?  If you have certain symptoms, such as unexplained or extra bleeding or bruising, it may mean that your blood is not clotting the way it should. You might have a deficiency in one of the clotting factors. You may need a blood test to check for the presence and function of the individual clotting factors to see if you have a deficiency in any of them.   What other tests might I have along with this test?  Along with tests to check for factor V deficiency, your blood may be checked for deficiencies in other clotting factors. Prothrombin time and partial thromboplastin times may also be checked.    What do my test results mean?  Test results may vary depending on your age, gender, health history, the " method used for the test, and other things. Your test results may not mean you have a problem. Ask your healthcare provider what your test results mean for you.    In a test of your clotting factors, the results are often given as a percentage. So if you get a result of 100%, it means your factor V is at 100% of its normal value. Levels between 20% and 70%mean a mild factor V deficiency. This usually causes no symptoms. Levels of 1% to 10% are severely low and can lead to major bleeding issues.   How is this test done?  The test is done with a blood sample. A needle is used to draw blood from a vein in your arm or hand.    Does this test pose any risks?  Having a blood test with a needle carries some risks. These include bleeding, infection, bruising, and feeling lightheaded. When the needle pricks your arm or hand, you may feel a slight sting or pain. Afterward, the site may be sore.    What might affect my test results?  A deficiency of factor V is quite rare (about 1 in 1 million) and can be a genetic disorder passed on from parents to their children. But some clotting factors, including factor V, can decrease because of certain illnesses, such as liver disease, cancers, autoimmune diseases, and a disease called disseminated intravascular coagulation. It can also happen after exposure to some toxins.   How do I get ready for this test?  You don't need to prepare for this test. Tell your healthcare provider about all medicines, herbs, vitamins, and supplements you are taking. This includes medicines that don't need a prescription and any illegal drugs you may use.    [employer] last reviewed this educational content on 10/1/2020    7482-3073 [employer]. All rights reserved. This information is not intended as a substitute for professional medical care. Always follow your healthcare professional's instructions.               No follow-ups on file.    Dheeraj Monk MD  [facility]  CASPER Doe is a 36 year old who presents for the following health issues  Chief Complaint   Patient presents with     Blood Draw     Pt would like to discuss having testing for Factor V Leiden due to family history.       HPI     Mother recently had a stroke. Factor V leiden deficient.    Patient denies hx of hypercoagulability.  No other concerns today.    Patient Active Problem List   Diagnosis     CARDIOVASCULAR SCREENING; LDL GOAL LESS THAN 160     MALISSA (generalized anxiety disorder)     Moderate major depression (H)     Chronic bilateral low back pain with left-sided sciatica     Family history of factor V Leiden mutation     Past Surgical History:   Procedure Laterality Date     SOFT TISSUE SURGERY         Social History     Tobacco Use     Smoking status: Never Smoker     Smokeless tobacco: Never Used   Substance Use Topics     Alcohol use: Yes     Alcohol/week: 1.7 standard drinks     Types: 2 Standard drinks or equivalent per week     Comment: weekend use     Family History   Problem Relation Age of Onset     Diabetes Mother      Arthritis Mother      Depression Mother      Cerebrovascular Disease Mother      Arthritis Father      Arthritis Maternal Grandmother      Eye Disorder Paternal Grandmother          Current Outpatient Medications   Medication Sig Dispense Refill     citalopram (CELEXA) 20 MG tablet Take 1 tablet (20 mg) by mouth daily Needs clinic follow up before any other refill is given (Patient not taking: Reported on 3/4/2021) 15 tablet 0     cyclobenzaprine (FLEXERIL) 10 MG tablet Take 1 tablet (10 mg) by mouth 3 times daily as needed for muscle spasms (Patient not taking: Reported on 3/4/2021) 30 tablet 1     No Known Allergies    Review of Systems   Constitutional, HEENT, cardiovascular, pulmonary, GI, , musculoskeletal, neuro, skin, endocrine and psych systems are negative, except as otherwise noted.      Objective           Vitals:  No vitals were obtained today due to  virtual visit.    Physical Exam   alert and no distress  PSYCH: Alert and oriented times 3; coherent speech, normal   rate and volume, able to articulate logical thoughts, able   to abstract reason, no tangential thoughts, no hallucinations   or delusions  His affect is normal  RESP: No cough, no audible wheezing, able to talk in full sentences  Remainder of exam unable to be completed due to telephone visits    No results found for any visits on 03/04/21.            Phone call duration: 4 minutes

## 2021-03-12 DIAGNOSIS — Z83.2 FAMILY HISTORY OF FACTOR V LEIDEN MUTATION: ICD-10-CM

## 2021-03-12 PROCEDURE — 81241 F5 GENE: CPT | Performed by: FAMILY MEDICINE

## 2021-03-12 PROCEDURE — 36415 COLL VENOUS BLD VENIPUNCTURE: CPT | Performed by: FAMILY MEDICINE

## 2021-03-12 PROCEDURE — G0452 MOLECULAR PATHOLOGY INTERPR: HCPCS | Performed by: PATHOLOGY

## 2021-03-18 LAB — COPATH REPORT: NORMAL

## 2021-04-18 ENCOUNTER — HEALTH MAINTENANCE LETTER (OUTPATIENT)
Age: 36
End: 2021-04-18

## 2021-10-02 ENCOUNTER — HEALTH MAINTENANCE LETTER (OUTPATIENT)
Age: 36
End: 2021-10-02

## 2021-10-19 PROBLEM — F32.9 MAJOR DEPRESSION: Status: ACTIVE | Noted: 2019-01-21

## 2022-05-09 ENCOUNTER — OFFICE VISIT (OUTPATIENT)
Dept: FAMILY MEDICINE | Facility: CLINIC | Age: 37
End: 2022-05-09
Payer: COMMERCIAL

## 2022-05-09 VITALS
RESPIRATION RATE: 14 BRPM | BODY MASS INDEX: 38.36 KG/M2 | HEART RATE: 70 BPM | TEMPERATURE: 97.7 F | HEIGHT: 76 IN | SYSTOLIC BLOOD PRESSURE: 130 MMHG | OXYGEN SATURATION: 98 % | WEIGHT: 315 LBS | DIASTOLIC BLOOD PRESSURE: 84 MMHG

## 2022-05-09 DIAGNOSIS — M54.41 CHRONIC BILATERAL LOW BACK PAIN WITH RIGHT-SIDED SCIATICA: ICD-10-CM

## 2022-05-09 DIAGNOSIS — G89.29 CHRONIC BILATERAL LOW BACK PAIN WITH RIGHT-SIDED SCIATICA: ICD-10-CM

## 2022-05-09 PROCEDURE — 99213 OFFICE O/P EST LOW 20 MIN: CPT | Performed by: FAMILY MEDICINE

## 2022-05-09 RX ORDER — CYCLOBENZAPRINE HCL 10 MG
10 TABLET ORAL 3 TIMES DAILY PRN
Qty: 30 TABLET | Refills: 1 | Status: SHIPPED | OUTPATIENT
Start: 2022-05-09 | End: 2022-09-23

## 2022-05-09 RX ORDER — METHYLPREDNISOLONE 4 MG
TABLET, DOSE PACK ORAL
Qty: 21 TABLET | Refills: 0 | Status: SHIPPED | OUTPATIENT
Start: 2022-05-09 | End: 2022-09-23

## 2022-05-09 ASSESSMENT — PATIENT HEALTH QUESTIONNAIRE - PHQ9
10. IF YOU CHECKED OFF ANY PROBLEMS, HOW DIFFICULT HAVE THESE PROBLEMS MADE IT FOR YOU TO DO YOUR WORK, TAKE CARE OF THINGS AT HOME, OR GET ALONG WITH OTHER PEOPLE: NOT DIFFICULT AT ALL
SUM OF ALL RESPONSES TO PHQ QUESTIONS 1-9: 1
SUM OF ALL RESPONSES TO PHQ QUESTIONS 1-9: 1

## 2022-05-09 ASSESSMENT — ENCOUNTER SYMPTOMS
GASTROINTESTINAL NEGATIVE: 1
ENDOCRINE NEGATIVE: 1
HEMATOLOGIC/LYMPHATIC NEGATIVE: 1
PARESTHESIAS: 0
PSYCHIATRIC NEGATIVE: 1
NUMBNESS: 1
CARDIOVASCULAR NEGATIVE: 1
BACK PAIN: 1
RESPIRATORY NEGATIVE: 1
CONSTITUTIONAL NEGATIVE: 1
ARTHRALGIAS: 1
EYES NEGATIVE: 1
ALLERGIC/IMMUNOLOGIC NEGATIVE: 1

## 2022-05-09 ASSESSMENT — ANXIETY QUESTIONNAIRES
GAD7 TOTAL SCORE: 3
3. WORRYING TOO MUCH ABOUT DIFFERENT THINGS: NOT AT ALL
8. IF YOU CHECKED OFF ANY PROBLEMS, HOW DIFFICULT HAVE THESE MADE IT FOR YOU TO DO YOUR WORK, TAKE CARE OF THINGS AT HOME, OR GET ALONG WITH OTHER PEOPLE?: NOT DIFFICULT AT ALL
7. FEELING AFRAID AS IF SOMETHING AWFUL MIGHT HAPPEN: NOT AT ALL
2. NOT BEING ABLE TO STOP OR CONTROL WORRYING: NOT AT ALL
6. BECOMING EASILY ANNOYED OR IRRITABLE: NOT AT ALL
4. TROUBLE RELAXING: SEVERAL DAYS
5. BEING SO RESTLESS THAT IT IS HARD TO SIT STILL: SEVERAL DAYS
GAD7 TOTAL SCORE: 3
7. FEELING AFRAID AS IF SOMETHING AWFUL MIGHT HAPPEN: NOT AT ALL
1. FEELING NERVOUS, ANXIOUS, OR ON EDGE: SEVERAL DAYS
GAD7 TOTAL SCORE: 3

## 2022-05-09 ASSESSMENT — PAIN SCALES - GENERAL: PAINLEVEL: EXTREME PAIN (8)

## 2022-05-09 NOTE — PROGRESS NOTES
Assessment & Plan     Chronic bilateral low back pain with right-sided sciatica  Medication faxed. Recommend stretching exercises.  - methylPREDNISolone (MEDROL DOSEPAK) 4 MG tablet therapy pack  Dispense: 21 tablet; Refill: 0  - cyclobenzaprine (FLEXERIL) 10 MG tablet  Dispense: 30 tablet; Refill: 1          FUTURE APPOINTMENTS:       - Follow-up visit in one month or sooner as needed    Return in about 4 weeks (around 6/6/2022) for Follow up.    Jermaine Leger MD  [facility] is a 37 year old who presents for the following health issues     HPI     37 yr old male here for low back pain. He has had this on and off for years. Patient reports no particular incidence that triggered the back pain this time. It is localized midline. He has also had some back spasms.     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  Date not sure when  Sees a specialist for back pain:  No  Therapies tried without relief: none    Alleviating factors:   Improved by: acetaminophen (Tylenol), heat, NSAIDs, Physical Therapy and rest - laying down helps     Precipitating factors:  Worsened by: Lifting, Bending, Standing, Sitting, Coughing and laughing          Accompanying Signs & Symptoms:  Risk of Fracture:  None  Risk of Cauda Equina:  None  Risk of Infection:  None  Risk of Cancer:   "None  Risk of Ankylosing Spondylitis:  Onset at age <35, male, AND morning back stiffness. no                         Review of Systems   Constitutional: Negative.    HENT: Negative.    Eyes: Negative.    Respiratory: Negative.    Cardiovascular: Negative.    Gastrointestinal: Negative.    Endocrine: Negative.    Genitourinary: Negative.    Musculoskeletal: Positive for arthralgias and back pain.   Skin: Negative.    Allergic/Immunologic: Negative.    Neurological: Positive for numbness. Negative for paresthesias.   Hematological: Negative.    Psychiatric/Behavioral: Negative.             Objective    /84 (BP Location: Left arm, Patient Position: Sitting, Cuff Size: Adult Large)   Pulse 70   Temp 97.7  F (36.5  C) (Tympanic)   Resp 14   Ht 1.93 m (6' 4\")   Wt 144.7 kg (319 lb)   SpO2 98%   BMI 38.83 kg/m    Body mass index is 38.83 kg/m .  Physical Exam  Constitutional:       Appearance: Normal appearance.   HENT:      Head: Normocephalic and atraumatic.   Eyes:      Pupils: Pupils are equal, round, and reactive to light.   Cardiovascular:      Rate and Rhythm: Normal rate and regular rhythm.      Pulses: Normal pulses.      Heart sounds: Normal heart sounds.   Pulmonary:      Effort: Pulmonary effort is normal.      Breath sounds: Normal breath sounds.   Abdominal:      General: Abdomen is flat. Bowel sounds are normal.      Palpations: Abdomen is soft.   Musculoskeletal:         General: Tenderness present.      Lumbar back: Spasms present. No tenderness. Decreased range of motion.        Back:       Comments: Pain in mid back   Skin:     General: Skin is warm and dry.   Neurological:      Mental Status: He is alert and oriented to person, place, and time.   Psychiatric:         Mood and Affect: Mood normal.         Behavior: Behavior normal.                "

## 2022-05-09 NOTE — PATIENT INSTRUCTIONS
When You Have Low Back Pain    Caring for Your Back  You are not alone.    Low back pain is very common. Nearly half of all adults have low back pain in any given year. The good news is that back pain is rarely a danger to your health. Most people can manage their back pain on their own and about half of them start feeling better within 2 weeks. In 9 out of 10 cases, low back pain goes away or no longer limits daily activity within 6 weeks.     Your outlook is good!    Your symptoms tell us that your low back pain is most likely not a danger to you. Most of the time we do not know the exact cause of low back pain, even if you see a doctor or have an MRI. However, treatment can still work without knowing the cause of the pain. Less than 1 in 100 people need surgery for their back pain.     What can I do about my low back pain?     There are three things you can do to ease low back pain and help it go away.   Use heat or cold packs.   Take medicine as directed.   Use positions, movements and exercises.     Using heat or cold packs    Try cold packs or gentle heat to ease your pain. Use whichever gives you the most relief. Apply the cold pack or heat for 15 minutes at a time, as often as needed.    Taking medicine     If your doctor has prescribed medicine, be sure to follow the directions.   If you take over-the-counter medicine, read and follow the directions.   Talk to your doctor if you have any questions.     Using positions, movements and exercises    Research tells us that moving your joints and muscles can help you recover from back pain. Such activity should be simple and gentle. Use the positions in the photos as well as walking to help relieve your pain. Try taking a short walk every 3 to 4 hours during the day. Walk for a few minutes inside your home or take longer walks outside, on a treadmill or at a mall. Slowly increase the amount of time you walk. Expect discomfort when you begin, but it should lessen  as your back starts to heal. When your back feels better, walk daily to keep your back and body healthy.    Finding a comfortable position    When your back pain is new, certain positions will ease your pain. Gently try each of the positions below until you find one that is helpful. Once you find a position of comfort, use it as often as you like when you are resting. You will recover faster if you combine rest with activity.         Lie on your back with your legs bent. You can do this by placing a pillow under your knees. Or you may lie on the floor and rest your lower legs on the seat of a chair.       Lie on your side with your knees bent, and place a pillow between your knees.       Lie on your stomach over pillows.      When should I call my doctor?    Your back pain should improve over the first couple of weeks. As it improves, you should be able to return to your normal activities. But call your doctor if:   You have a sudden change in your ability to control your bladder or bowels.   You feel tingling in your groin or legs.   The pain spreads down your leg and into your foot.   Your toes, feet or leg muscles feel weak.   You feel generally unwell or sick.   Your pain does not get better or gets worse.      If you are deaf or hard of hearing, please let us know. We provide many free services including sign language interpreters,oral interpreters, TTYs, telephone amplifiers, note takers and written materials.    For informational purposes only. Not to replace the advice of your health care provider. Copyright   2013 [facility]. All rights reserved. [employer] 056080 - Rev 06/14.

## 2022-05-10 ASSESSMENT — ANXIETY QUESTIONNAIRES: GAD7 TOTAL SCORE: 3

## 2022-05-14 ENCOUNTER — HEALTH MAINTENANCE LETTER (OUTPATIENT)
Age: 37
End: 2022-05-14

## 2022-07-11 ENCOUNTER — OFFICE VISIT (OUTPATIENT)
Dept: FAMILY MEDICINE | Facility: CLINIC | Age: 37
End: 2022-07-11
Payer: COMMERCIAL

## 2022-07-11 ENCOUNTER — ANCILLARY PROCEDURE (OUTPATIENT)
Dept: GENERAL RADIOLOGY | Facility: CLINIC | Age: 37
End: 2022-07-11
Attending: NURSE PRACTITIONER
Payer: COMMERCIAL

## 2022-07-11 VITALS
RESPIRATION RATE: 20 BRPM | HEIGHT: 76 IN | SYSTOLIC BLOOD PRESSURE: 132 MMHG | WEIGHT: 314 LBS | BODY MASS INDEX: 38.24 KG/M2 | TEMPERATURE: 97.7 F | OXYGEN SATURATION: 97 % | HEART RATE: 109 BPM | DIASTOLIC BLOOD PRESSURE: 90 MMHG

## 2022-07-11 DIAGNOSIS — Z13.220 SCREENING FOR LIPID DISORDERS: ICD-10-CM

## 2022-07-11 DIAGNOSIS — R06.09 EXERTIONAL DYSPNEA: ICD-10-CM

## 2022-07-11 DIAGNOSIS — J20.9 ACUTE WHEEZY BRONCHITIS: Primary | ICD-10-CM

## 2022-07-11 LAB
ANION GAP SERPL CALCULATED.3IONS-SCNC: 5 MMOL/L (ref 3–14)
BASOPHILS # BLD AUTO: 0.1 10E3/UL (ref 0–0.2)
BASOPHILS NFR BLD AUTO: 1 %
BUN SERPL-MCNC: 11 MG/DL (ref 7–30)
CALCIUM SERPL-MCNC: 9.2 MG/DL (ref 8.5–10.1)
CHLORIDE BLD-SCNC: 104 MMOL/L (ref 94–109)
CHOLEST SERPL-MCNC: 182 MG/DL
CO2 SERPL-SCNC: 27 MMOL/L (ref 20–32)
CREAT SERPL-MCNC: 1.01 MG/DL (ref 0.66–1.25)
EOSINOPHIL # BLD AUTO: 0.6 10E3/UL (ref 0–0.7)
EOSINOPHIL NFR BLD AUTO: 6 %
ERYTHROCYTE [DISTWIDTH] IN BLOOD BY AUTOMATED COUNT: 13.5 % (ref 10–15)
FASTING STATUS PATIENT QL REPORTED: NO
GFR SERPL CREATININE-BSD FRML MDRD: >90 ML/MIN/1.73M2
GLUCOSE BLD-MCNC: 98 MG/DL (ref 70–99)
HCT VFR BLD AUTO: 47.2 % (ref 40–53)
HDLC SERPL-MCNC: 38 MG/DL
HGB BLD-MCNC: 15.7 G/DL (ref 13.3–17.7)
LDLC SERPL CALC-MCNC: 109 MG/DL
LYMPHOCYTES # BLD AUTO: 2.3 10E3/UL (ref 0.8–5.3)
LYMPHOCYTES NFR BLD AUTO: 24 %
MCH RBC QN AUTO: 28.9 PG (ref 26.5–33)
MCHC RBC AUTO-ENTMCNC: 33.3 G/DL (ref 31.5–36.5)
MCV RBC AUTO: 87 FL (ref 78–100)
MONOCYTES # BLD AUTO: 1.2 10E3/UL (ref 0–1.3)
MONOCYTES NFR BLD AUTO: 12 %
NEUTROPHILS # BLD AUTO: 5.6 10E3/UL (ref 1.6–8.3)
NEUTROPHILS NFR BLD AUTO: 58 %
NONHDLC SERPL-MCNC: 144 MG/DL
NT-PROBNP SERPL-MCNC: 10 PG/ML (ref 0–450)
PLATELET # BLD AUTO: 225 10E3/UL (ref 150–450)
POTASSIUM BLD-SCNC: 4.1 MMOL/L (ref 3.4–5.3)
RBC # BLD AUTO: 5.44 10E6/UL (ref 4.4–5.9)
SODIUM SERPL-SCNC: 136 MMOL/L (ref 133–144)
TRIGL SERPL-MCNC: 176 MG/DL
WBC # BLD AUTO: 9.7 10E3/UL (ref 4–11)

## 2022-07-11 PROCEDURE — 71046 X-RAY EXAM CHEST 2 VIEWS: CPT | Mod: TC | Performed by: RADIOLOGY

## 2022-07-11 PROCEDURE — 93000 ELECTROCARDIOGRAM COMPLETE: CPT | Performed by: NURSE PRACTITIONER

## 2022-07-11 PROCEDURE — 36415 COLL VENOUS BLD VENIPUNCTURE: CPT | Performed by: NURSE PRACTITIONER

## 2022-07-11 PROCEDURE — 83880 ASSAY OF NATRIURETIC PEPTIDE: CPT | Performed by: NURSE PRACTITIONER

## 2022-07-11 PROCEDURE — 80048 BASIC METABOLIC PNL TOTAL CA: CPT | Performed by: NURSE PRACTITIONER

## 2022-07-11 PROCEDURE — 99214 OFFICE O/P EST MOD 30 MIN: CPT | Performed by: NURSE PRACTITIONER

## 2022-07-11 PROCEDURE — 85025 COMPLETE CBC W/AUTO DIFF WBC: CPT | Performed by: NURSE PRACTITIONER

## 2022-07-11 PROCEDURE — 80061 LIPID PANEL: CPT | Performed by: NURSE PRACTITIONER

## 2022-07-11 RX ORDER — ALBUTEROL SULFATE 90 UG/1
2 AEROSOL, METERED RESPIRATORY (INHALATION) EVERY 6 HOURS
Qty: 18 G | Refills: 0 | Status: SHIPPED | OUTPATIENT
Start: 2022-07-11 | End: 2022-08-03

## 2022-07-11 RX ORDER — BENZONATATE 200 MG/1
200 CAPSULE ORAL 3 TIMES DAILY PRN
Qty: 30 CAPSULE | Refills: 0 | Status: SHIPPED | OUTPATIENT
Start: 2022-07-11 | End: 2022-09-23

## 2022-07-11 RX ORDER — DEXTROMETHORPHAN POLISTIREX 30 MG/5ML
60 SUSPENSION ORAL 2 TIMES DAILY
COMMUNITY
End: 2022-09-23

## 2022-07-11 RX ORDER — DOXYCYCLINE HYCLATE 100 MG
100 TABLET ORAL 2 TIMES DAILY
Qty: 14 TABLET | Refills: 0 | Status: SHIPPED | OUTPATIENT
Start: 2022-07-11 | End: 2022-07-18

## 2022-07-11 ASSESSMENT — PAIN SCALES - GENERAL: PAINLEVEL: SEVERE PAIN (7)

## 2022-07-11 NOTE — PATIENT INSTRUCTIONS
Start doxycycline  Can use albuterol inhaler as needed.    Please contact the cardiac testing department at [phone] to schedule echocardiogram.

## 2022-07-11 NOTE — PROGRESS NOTES
"  Assessment & Plan     Acute wheezy bronchitis  Start doxy, can use albuterol as needed for cough, wheezing. RTC if not improving.  - XR Chest 2 Views  - doxycycline hyclate (VIBRA-TABS) 100 MG tablet; Take 1 tablet (100 mg) by mouth 2 times daily for 7 days  - albuterol (PROAIR HFA/PROVENTIL HFA/VENTOLIN HFA) 108 (90 Base) MCG/ACT inhaler; Inhale 2 puffs into the lungs every 6 hours  - benzonatate (TESSALON) 200 MG capsule; Take 1 capsule (200 mg) by mouth 3 times daily as needed for cough    Exertional dyspnea  Some evidence of LA enlargement, LBBB on EKG. Exertional dyspnea, with repeated episodes in the past month. I did question mild cardiomegaly on CXR, however radiology read was normal.  Was hypertensive on arrival today. Symptoms may be related to bronchitis, however given exertional dyspnea, EKG findings, will obtain echo, labs to further evaluate.  - EKG 12-lead complete w/read - Clinics  - Echocardiogram Complete; Future  - CBC with platelets and differential; Future  - Basic metabolic panel  (Ca, Cl, CO2, Creat, Gluc, K, Na, BUN); Future  - BNP-N terminal pro; Future      Screening for lipid disorders    - Lipid panel reflex to direct LDL Fasting; Future  - Lipid panel reflex to direct LDL Fasting    0956}     BMI:   Estimated body mass index is 38.22 kg/m  as calculated from the following:    Height as of this encounter: 1.93 m (6' 4\").    Weight as of this encounter: 142.4 kg (314 lb).       Patient Instructions   Start doxycycline  Can use albuterol inhaler as needed.    Please contact the cardiac testing department at [phone] to schedule echocardiogram.        Return in about 1 week (around 7/18/2022) for worsening or continued symptoms.    ILA Poole CNP  [facility]    Gorge Doe is a 37 year old, presenting for the following health issues:  Cough      History of Present Illness       Headaches:   Since the patient's last clinic visit, headaches are: " "worsened  The patient is getting headaches:  All day  He is not able to do normal daily activities when he has a migraine.  The patient is taking the following rescue/relief medications:  Ibuprofen (Advil, Motrin)   Patient states \"I get some relief\" from the rescue/relief medications.   The patient is taking the following medications to prevent migraines:  No medications to prevent migraines  In the past 4 weeks, the patient has gone to an Urgent Care or Emergency Room 0 times times due to headaches.    Reason for visit:  Chest infection second time in 4weeks  Symptom onset:  3-7 days ago  Symptoms include:  Cough sinus pressure congestion chest pain headache  Symptom intensity:  Severe  Symptom progression:  Worsening  Had these symptoms before:  Yes  Has tried/received treatment for these symptoms:  Yes  Previous treatment was successful:  No    He eats 2-3 servings of fruits and vegetables daily.He consumes 0 sweetened beverage(s) daily.He exercises with enough effort to increase his heart rate 20 to 29 minutes per day.  He exercises with enough effort to increase his heart rate 4 days per week.   He is taking medications regularly.     Above HPI reviewed. Additionally, about a month ago, had cough, exertional dyspnea.  This lasted several weeks, then resolved spontaneously.  For the past week, he has had some chest tightness, dry cough, exertional dyspnea.  He does also have some nasal discharge.  No fevers, no sore throat.  No shortness of breath at rest.  No lower extremity swelling.  Does note that he has a family history of of early cardiac disease, several uncles with MI prior to age 55.      Review of Systems   Constitutional, HEENT, cardiovascular, pulmonary, gi and gu systems are negative, except as otherwise noted.      Objective    BP (!) 152/104 (BP Location: Right arm, Patient Position: Sitting, Cuff Size: Adult Large)   Pulse 109   Temp 97.7  F (36.5  C) (Tympanic)   Resp 20   Ht 1.93 m (6' 4\") "   Wt 142.4 kg (314 lb)   SpO2 97%   BMI 38.22 kg/m    Body mass index is 38.22 kg/m .  Physical Exam  Vitals and nursing note reviewed.   Constitutional:       Appearance: Normal appearance.   HENT:      Head: Normocephalic and atraumatic.      Right Ear: Tympanic membrane and ear canal normal.      Left Ear: Tympanic membrane and ear canal normal.      Nose: Nose normal. No rhinorrhea.      Right Sinus: No maxillary sinus tenderness or frontal sinus tenderness.      Left Sinus: No maxillary sinus tenderness or frontal sinus tenderness.      Mouth/Throat:      Lips: Pink.      Mouth: Mucous membranes are moist.      Pharynx: Oropharynx is clear.   Eyes:      General: Lids are normal.      Conjunctiva/sclera: Conjunctivae normal.      Comments: Non icteric   Cardiovascular:      Rate and Rhythm: Regular rhythm. Tachycardia present.      Pulses: Normal pulses.      Heart sounds: Normal heart sounds, S1 normal and S2 normal.   Pulmonary:      Effort: Pulmonary effort is normal.      Breath sounds: Normal air entry. Wheezing (expiratory, bilat) present.   Abdominal:      General: Abdomen is flat.      Palpations: Abdomen is soft.   Musculoskeletal:      Cervical back: Neck supple.      Right lower leg: No edema.      Left lower leg: No edema.   Lymphadenopathy:      Cervical: No cervical adenopathy.   Skin:     General: Skin is warm and dry.   Neurological:      General: No focal deficit present.      Mental Status: He is alert and oriented to person, place, and time.   Psychiatric:         Mood and Affect: Mood normal.         Behavior: Behavior normal.         Thought Content: Thought content normal.         Judgment: Judgment normal.            CXR - Reviewed and interpreted by me At the time of my review, did question mild cardiomegaly, no infiltrate or effusion  EKG - Reviewed and interpreted by me sinus tachycardia, normal axis, normal intervals, no acute ST/T changes c/w ischemia, no LVH by voltage criteria,  there are no prior tracings available, downward deflection of P waves in V1, LBBB  Results for orders placed or performed in visit on 07/11/22 (from the past 24 hour(s))   XR Chest 2 Views    Narrative    CHEST TWO VIEWS 7/11/2022 3:39 PM     HISTORY: cough x 1 week, ROLANDO rhonchi, wheeze; Cough    COMPARISON: None.       Impression    IMPRESSION: The cardiac silhouette is within normal limits. No  evidence for infiltrate or effusion. No significant bony  abnormalities.    MARCUS HORNE MD         SYSTEM ID:  [id number]   CBC with platelets and differential    Narrative    The following orders were created for panel order CBC with platelets and differential.  Procedure                               Abnormality         Status                     ---------                               -----------         ------                     CBC with platelets and d...[[id number]]                      Final result                 Please view results for these tests on the individual orders.   CBC with platelets and differential   Result Value Ref Range    WBC Count 9.7 4.0 - 11.0 10e3/uL    RBC Count 5.44 4.40 - 5.90 10e6/uL    Hemoglobin 15.7 13.3 - 17.7 g/dL    Hematocrit 47.2 40.0 - 53.0 %    MCV 87 78 - 100 fL    MCH 28.9 26.5 - 33.0 pg    MCHC 33.3 31.5 - 36.5 g/dL    RDW 13.5 10.0 - 15.0 %    Platelet Count 225 150 - 450 10e3/uL    % Neutrophils 58 %    % Lymphocytes 24 %    % Monocytes 12 %    % Eosinophils 6 %    % Basophils 1 %    Absolute Neutrophils 5.6 1.6 - 8.3 10e3/uL    Absolute Lymphocytes 2.3 0.8 - 5.3 10e3/uL    Absolute Monocytes 1.2 0.0 - 1.3 10e3/uL    Absolute Eosinophils 0.6 0.0 - 0.7 10e3/uL    Absolute Basophils 0.1 0.0 - 0.2 10e3/uL                   .  ..

## 2022-08-10 ENCOUNTER — TELEPHONE (OUTPATIENT)
Dept: FAMILY MEDICINE | Facility: CLINIC | Age: 37
End: 2022-08-10

## 2022-08-10 NOTE — TELEPHONE ENCOUNTER
Patient my charts back today that he must have misunderstood the question and now says that he does not have thoughts of harm to self, will resend new PHQ to update, no need to call this patient back. Closing this encounter.          LUIZ Joya

## 2022-08-10 NOTE — TELEPHONE ENCOUNTER
Patient flagged on question 9 of the PHQ today and says several days to this question.     Left message for the patient to call back. Also send my chart.        LUIZ Joya

## 2022-08-23 DIAGNOSIS — F41.1 GAD (GENERALIZED ANXIETY DISORDER): ICD-10-CM

## 2022-08-23 DIAGNOSIS — F32.1 MODERATE MAJOR DEPRESSION (H): ICD-10-CM

## 2022-08-24 RX ORDER — CITALOPRAM HYDROBROMIDE 20 MG/1
20 TABLET ORAL DAILY
Qty: 30 TABLET | Refills: 0 | Status: SHIPPED | OUTPATIENT
Start: 2022-08-24 | End: 2022-09-23

## 2022-08-24 NOTE — TELEPHONE ENCOUNTER
"Requested Prescriptions   Pending Prescriptions Disp Refills     citalopram (CELEXA) 20 MG tablet [Pharmacy Med Name: CITALOPRAM HBR 20 MG TABLET] 15 tablet 0     Sig: TAKE 1 TABLET (20 MG) BY MOUTH DAILY NEEDS CLINIC FOLLOW UP BEFORE ANY OTHER REFILL IS GIVEN       SSRIs Protocol Failed - 8/23/2022  9:14 AM        Failed - PHQ-9 score less than 5 in past 6 months     Please review last PHQ-9 score.           Passed - Medication is active on med list        Passed - Patient is age 18 or older        Passed - Recent (6 mo) or future (30 days) visit within the authorizing provider's specialty     Patient had office visit in the last 6 months or has a visit in the next 30 days with authorizing provider or within the authorizing provider's specialty.  See \"Patient Info\" tab in inbasket, or \"Choose Columns\" in Meds & Orders section of the refill encounter.                 "

## 2022-08-24 NOTE — TELEPHONE ENCOUNTER
Please call patient to schedule at least an in clinic follow up for depression as his PHQ9 score was very high, and he is overdue for a follow up.

## 2022-09-03 ENCOUNTER — HEALTH MAINTENANCE LETTER (OUTPATIENT)
Age: 37
End: 2022-09-03

## 2022-09-23 ENCOUNTER — OFFICE VISIT (OUTPATIENT)
Dept: FAMILY MEDICINE | Facility: CLINIC | Age: 37
End: 2022-09-23
Payer: COMMERCIAL

## 2022-09-23 VITALS
DIASTOLIC BLOOD PRESSURE: 88 MMHG | OXYGEN SATURATION: 97 % | SYSTOLIC BLOOD PRESSURE: 136 MMHG | WEIGHT: 315 LBS | BODY MASS INDEX: 38.36 KG/M2 | HEIGHT: 76 IN | TEMPERATURE: 96.9 F | HEART RATE: 79 BPM | RESPIRATION RATE: 18 BRPM

## 2022-09-23 DIAGNOSIS — F41.1 GAD (GENERALIZED ANXIETY DISORDER): Primary | ICD-10-CM

## 2022-09-23 DIAGNOSIS — F32.1 MODERATE MAJOR DEPRESSION (H): ICD-10-CM

## 2022-09-23 DIAGNOSIS — N52.2 DRUG-INDUCED ERECTILE DYSFUNCTION: ICD-10-CM

## 2022-09-23 PROCEDURE — 99213 OFFICE O/P EST LOW 20 MIN: CPT | Performed by: NURSE PRACTITIONER

## 2022-09-23 RX ORDER — TADALAFIL 10 MG/1
10 TABLET ORAL DAILY PRN
Qty: 30 TABLET | Refills: 11 | Status: SHIPPED | OUTPATIENT
Start: 2022-09-23

## 2022-09-23 RX ORDER — CITALOPRAM HYDROBROMIDE 20 MG/1
20 TABLET ORAL DAILY
Qty: 90 TABLET | Refills: 3 | Status: SHIPPED | OUTPATIENT
Start: 2022-09-23 | End: 2022-09-23

## 2022-09-23 RX ORDER — CITALOPRAM HYDROBROMIDE 20 MG/1
20 TABLET ORAL DAILY
Qty: 90 TABLET | Refills: 3 | Status: SHIPPED | OUTPATIENT
Start: 2022-09-23 | End: 2023-10-27

## 2022-09-23 ASSESSMENT — ANXIETY QUESTIONNAIRES
GAD7 TOTAL SCORE: 5
2. NOT BEING ABLE TO STOP OR CONTROL WORRYING: SEVERAL DAYS
GAD7 TOTAL SCORE: 5
3. WORRYING TOO MUCH ABOUT DIFFERENT THINGS: SEVERAL DAYS
7. FEELING AFRAID AS IF SOMETHING AWFUL MIGHT HAPPEN: NOT AT ALL
8. IF YOU CHECKED OFF ANY PROBLEMS, HOW DIFFICULT HAVE THESE MADE IT FOR YOU TO DO YOUR WORK, TAKE CARE OF THINGS AT HOME, OR GET ALONG WITH OTHER PEOPLE?: SOMEWHAT DIFFICULT
4. TROUBLE RELAXING: SEVERAL DAYS
5. BEING SO RESTLESS THAT IT IS HARD TO SIT STILL: NOT AT ALL
1. FEELING NERVOUS, ANXIOUS, OR ON EDGE: SEVERAL DAYS
GAD7 TOTAL SCORE: 5
6. BECOMING EASILY ANNOYED OR IRRITABLE: SEVERAL DAYS
7. FEELING AFRAID AS IF SOMETHING AWFUL MIGHT HAPPEN: NOT AT ALL
IF YOU CHECKED OFF ANY PROBLEMS ON THIS QUESTIONNAIRE, HOW DIFFICULT HAVE THESE PROBLEMS MADE IT FOR YOU TO DO YOUR WORK, TAKE CARE OF THINGS AT HOME, OR GET ALONG WITH OTHER PEOPLE: SOMEWHAT DIFFICULT

## 2022-09-23 ASSESSMENT — PATIENT HEALTH QUESTIONNAIRE - PHQ9
10. IF YOU CHECKED OFF ANY PROBLEMS, HOW DIFFICULT HAVE THESE PROBLEMS MADE IT FOR YOU TO DO YOUR WORK, TAKE CARE OF THINGS AT HOME, OR GET ALONG WITH OTHER PEOPLE: SOMEWHAT DIFFICULT
SUM OF ALL RESPONSES TO PHQ QUESTIONS 1-9: 5
SUM OF ALL RESPONSES TO PHQ QUESTIONS 1-9: 5

## 2022-09-23 NOTE — PATIENT INSTRUCTIONS
Studies have shown the following to be effective in lowering LDL cholesterol levels:    Mediterranean, Vegetarian, and DASH diets.     Red yeast rice: [id number] mg daily in divided doses    Nuts: Walnuts, almonds, pistachios    Berberine: 900-1500 mg daily    Fiber: such as Metamucil, other fiber supplement or through food sources: at least 10 gm daily

## 2022-09-23 NOTE — PROGRESS NOTES
"  Assessment & Plan     MALISSA (generalized anxiety disorder)    - citalopram (CELEXA) 20 MG tablet; Take 1 tablet (20 mg) by mouth daily    Moderate major depression (H)    - citalopram (CELEXA) 20 MG tablet; Take 1 tablet (20 mg) by mouth daily    Drug-induced erectile dysfunction    - tadalafil (CIALIS) 10 MG tablet; Take 1 tablet (10 mg) by mouth daily as needed (30-60 minutes prior to intercourse)             BMI:   Estimated body mass index is 38.39 kg/m  as calculated from the following:    Height as of this encounter: 1.93 m (6' 4\").    Weight as of this encounter: 143.1 kg (315 lb 6.4 oz).   Weight management plan: Discussed healthy diet and exercise guidelines    FUTURE APPOINTMENTS:       - Follow-up for annual visit or as needed    No follow-ups on file.    IL[facility]   Nader is a 37 year old, presenting for the following health issues:  Depression      History of Present Illness       Mental Health Follow-up:  Patient presents to follow-up on Depression & Anxiety.Patient's depression since last visit has been:  Good  The patient is not having other symptoms associated with depression.  Patient's anxiety since last visit has been:  Good  The patient is not having other symptoms associated with anxiety.  Any significant life events: No  Patient is not feeling anxious or having panic attacks.  Patient has no concerns about alcohol or drug use.    He eats 0-1 servings of fruits and vegetables daily.He consumes 0 sweetened beverage(s) daily.He exercises with enough effort to increase his heart rate 20 to 29 minutes per day.  He exercises with enough effort to increase his heart rate 4 days per week.   He is taking medications regularly.    Today's PHQ-9         PHQ-9 Total Score: 5    PHQ-9 Q9 Thoughts of better off dead/self-harm past 2 weeks :   Not at all    How difficult have these problems made it for you to do your work, take care of " "things at home, or get along with other people: Somewhat difficult  Today's MALISSA-7 Score: 5       Medication Followup of Celexa    Taking Medication as prescribed: yes    Side Effects:  ED- had some generic Cialis from an online provider which worked well for that    Medication Helping Symptoms:  Yes.       Review of Systems   Constitutional, HEENT, cardiovascular, pulmonary, gi and gu systems are negative, except as otherwise noted.      Objective    /88   Pulse 79   Temp 96.9  F (36.1  C) (Tympanic)   Resp 18   Ht 1.93 m (6' 4\")   Wt 143.1 kg (315 lb 6.4 oz)   SpO2 97%   BMI 38.39 kg/m    Body mass index is 38.39 kg/m .  Physical Exam   GENERAL: healthy, alert and no distress  MS: no gross musculoskeletal defects noted, no edema  NEURO: Normal strength and tone, mentation intact and speech normal  PSYCH: mentation appears normal, affect normal/bright                    "

## 2023-01-11 ENCOUNTER — MYC MEDICAL ADVICE (OUTPATIENT)
Dept: FAMILY MEDICINE | Facility: CLINIC | Age: 38
End: 2023-01-11

## 2023-02-02 ENCOUNTER — MYC MEDICAL ADVICE (OUTPATIENT)
Dept: FAMILY MEDICINE | Facility: CLINIC | Age: 38
End: 2023-02-02
Payer: COMMERCIAL

## 2023-06-03 ENCOUNTER — HEALTH MAINTENANCE LETTER (OUTPATIENT)
Age: 38
End: 2023-06-03

## 2023-06-27 ENCOUNTER — OFFICE VISIT (OUTPATIENT)
Dept: FAMILY MEDICINE | Facility: CLINIC | Age: 38
End: 2023-06-27
Payer: COMMERCIAL

## 2023-06-27 VITALS
OXYGEN SATURATION: 97 % | WEIGHT: 315 LBS | BODY MASS INDEX: 39.17 KG/M2 | HEIGHT: 75 IN | TEMPERATURE: 97 F | HEART RATE: 82 BPM | SYSTOLIC BLOOD PRESSURE: 138 MMHG | RESPIRATION RATE: 20 BRPM | DIASTOLIC BLOOD PRESSURE: 88 MMHG

## 2023-06-27 DIAGNOSIS — M54.50 CHRONIC MIDLINE LOW BACK PAIN WITHOUT SCIATICA: Primary | ICD-10-CM

## 2023-06-27 DIAGNOSIS — G89.29 CHRONIC MIDLINE LOW BACK PAIN WITHOUT SCIATICA: Primary | ICD-10-CM

## 2023-06-27 PROCEDURE — 99213 OFFICE O/P EST LOW 20 MIN: CPT | Performed by: NURSE PRACTITIONER

## 2023-06-27 RX ORDER — PREDNISONE 20 MG/1
40 TABLET ORAL DAILY
Qty: 10 TABLET | Refills: 0 | Status: SHIPPED | OUTPATIENT
Start: 2023-06-27 | End: 2023-07-02

## 2023-06-27 RX ORDER — CYCLOBENZAPRINE HCL 10 MG
10 TABLET ORAL 3 TIMES DAILY PRN
Qty: 30 TABLET | Refills: 1 | Status: SHIPPED | OUTPATIENT
Start: 2023-06-27

## 2023-06-27 ASSESSMENT — PATIENT HEALTH QUESTIONNAIRE - PHQ9
SUM OF ALL RESPONSES TO PHQ QUESTIONS 1-9: 3
10. IF YOU CHECKED OFF ANY PROBLEMS, HOW DIFFICULT HAVE THESE PROBLEMS MADE IT FOR YOU TO DO YOUR WORK, TAKE CARE OF THINGS AT HOME, OR GET ALONG WITH OTHER PEOPLE: SOMEWHAT DIFFICULT
SUM OF ALL RESPONSES TO PHQ QUESTIONS 1-9: 3

## 2023-06-27 ASSESSMENT — PAIN SCALES - GENERAL: PAINLEVEL: EXTREME PAIN (8)

## 2023-06-27 NOTE — PATIENT INSTRUCTIONS
Prednisone (steroid) 2 tablets once daily for 5 days.  Tylenol 1000 mg every 8 hours for pain.  Cyclobenzaprine one tablet every 8 hours if needed for muscle tightness/spasms     When prednisone is completed, you may use ibuprofen 600 mg every 6 hours as needed for pain.     Heat to area for 15-20 minutes several times per day.     Schedule PT.

## 2023-06-27 NOTE — PROGRESS NOTES
Assessment & Plan     Chronic midline low back pain without sciatica  - predniSONE (DELTASONE) 20 MG tablet; Take 2 tablets (40 mg) by mouth daily for 5 days  - cyclobenzaprine (FLEXERIL) 10 MG tablet; Take 1 tablet (10 mg) by mouth 3 times daily as needed for muscle spasms  - Physical Therapy Referral; Future      Patient Instructions   Prednisone (steroid) 2 tablets once daily for 5 days.  Tylenol 1000 mg every 8 hours for pain.  Cyclobenzaprine one tablet every 8 hours if needed for muscle tightness/spasms     When prednisone is completed, you may use ibuprofen 600 mg every 6 hours as needed for pain.     Heat to area for 15-20 minutes several times per day.     Schedule PT.    The risks, benefits and treatment options of prescribed medications or other treatments have been discussed with the patient. The patient verbalized their understanding and should call or follow up if no improvement or if they develop further problems.    [facility] CASPER Doe is a 38 year old, presenting for the following health issues:  Back Pain        6/27/2023     9:41 AM   Additional Questions   Roomed by Felicita MATA   Accompanied by self         6/27/2023     9:41 AM   Patient Reported Additional Medications   Patient reports taking the following new medications no new meds     History of Present Illness       Back Pain:  He presents for follow up of back pain. Patient's back pain is a chronic problem.  Location of back pain:  Right lower back, left lower back, right buttock, left buttock, right hip and left hip  Description of back pain: sharp, shooting and stabbing  Back pain spreads: right buttocks and left buttocks    Since patient first noticed back pain, pain is: gradually worsening  Does back pain interfere with his job:  Yes      He eats 0-1 servings of fruits and vegetables daily.He consumes 0 sweetened beverage(s) daily.He exercises with enough effort to  "increase his heart rate 10 to 19 minutes per day.  He exercises with enough effort to increase his heart rate 5 days per week. He is missing 1 dose(s) of medications per week.    Today's PHQ-9         PHQ-9 Total Score: 3    PHQ-9 Q9 Thoughts of better off dead/self-harm past 2 weeks :   Not at all    How difficult have these problems made it for you to do your work, take care of things at home, or get along with other people: Somewhat difficult     Patient reports back pain on and off for years.  This current episode started 3 days ago.  No triggering events.  States that he just woke up in pain.  Pain is located in the middle of his low back.  It radiates into both buttocks.  No radiation of pain down legs.  Denies any leg weakness.  No numbness or tingling.  No incontinence or saddle paresthesias.  Only previous imaging was an x-ray in 2019; results were normal.  Was last seen by this provider for back pain in 2020.  Patient states that he got relief of his pain with physical therapy, a course of steroids and muscle relaxants.  Currently taking ibuprofen which is somewhat helpful.  He states that this episode of pain is similar to previous episodes, although the pain severity is worse.          Review of Systems   Constitutional, HEENT, cardiovascular, pulmonary, gi and gu systems are negative, except as otherwise noted.      Objective    /88   Pulse 82   Temp 97  F (36.1  C) (Tympanic)   Resp 20   Ht 1.905 m (6' 3\")   Wt 146.1 kg (322 lb)   SpO2 97%   BMI 40.25 kg/m    Body mass index is 40.25 kg/m .  Physical Exam   GENERAL: healthy, alert and no distress  Comprehensive back pain exam:  No tenderness, Pain limits the following motions: changing positions, Lower extremity strength functional and equal on both sides, Lower extremity reflexes within normal limits bilaterally, Lower extremity sensation normal and equal on both sides and Straight leg raise negative bilaterally                    Answers " for HPI/ROS submitted by the patient on 6/27/2023  If you checked off any problems, how difficult have these problems made it for you to do your work, take care of things at home, or get along with other people?: Somewhat difficult  PHQ9 TOTAL SCORE: 3

## 2023-10-27 DIAGNOSIS — F41.1 GAD (GENERALIZED ANXIETY DISORDER): ICD-10-CM

## 2023-10-27 DIAGNOSIS — F32.1 MODERATE MAJOR DEPRESSION (H): ICD-10-CM

## 2023-10-27 RX ORDER — CITALOPRAM HYDROBROMIDE 20 MG/1
20 TABLET ORAL DAILY
Qty: 30 TABLET | Refills: 0 | Status: SHIPPED | OUTPATIENT
Start: 2023-10-27 | End: 2023-12-11

## 2023-10-27 NOTE — TELEPHONE ENCOUNTER
Prescription approved per [facility] Refill Protocol     Sherrie Handy     RN MSN

## 2023-12-10 DIAGNOSIS — F32.1 MODERATE MAJOR DEPRESSION (H): ICD-10-CM

## 2023-12-10 DIAGNOSIS — F41.1 GAD (GENERALIZED ANXIETY DISORDER): ICD-10-CM

## 2023-12-11 RX ORDER — CITALOPRAM HYDROBROMIDE 20 MG/1
20 TABLET ORAL DAILY
Qty: 90 TABLET | Refills: 0 | Status: SHIPPED | OUTPATIENT
Start: 2023-12-11 | End: 2024-04-01

## 2024-03-30 DIAGNOSIS — F32.1 MODERATE MAJOR DEPRESSION (H): ICD-10-CM

## 2024-03-30 DIAGNOSIS — F41.1 GAD (GENERALIZED ANXIETY DISORDER): ICD-10-CM

## 2024-04-01 RX ORDER — CITALOPRAM HYDROBROMIDE 20 MG/1
20 TABLET ORAL DAILY
Qty: 60 TABLET | Refills: 0 | Status: SHIPPED | OUTPATIENT
Start: 2024-04-01 | End: 2024-05-28

## 2024-04-01 NOTE — TELEPHONE ENCOUNTER
"Requested Prescriptions   Pending Prescriptions Disp Refills    citalopram (CELEXA) 20 MG tablet [Pharmacy Med Name: Citalopram Hydrobromide 20 MG Oral Tablet] 90 tablet 0     Sig: Take 1 tablet by mouth once daily       SSRIs Protocol Failed - 3/30/2024 10:08 AM        Failed - PHQ-9 score less than 5 in past 6 months     Please review last PHQ-9 score.           Failed - Recent (6 mo) or future (30 days) visit within the authorizing provider's specialty     Patient had office visit in the last 6 months or has a visit in the next 30 days with authorizing provider or within the authorizing provider's specialty.  See \"Patient Info\" tab in inbasket, or \"Choose Columns\" in Meds & Orders section of the refill encounter.            Passed - Medication is active on med list        Passed - Patient is age 18 or older             "

## 2024-05-28 ENCOUNTER — MYC REFILL (OUTPATIENT)
Dept: FAMILY MEDICINE | Facility: CLINIC | Age: 39
End: 2024-05-28
Payer: COMMERCIAL

## 2024-05-28 DIAGNOSIS — F41.1 GAD (GENERALIZED ANXIETY DISORDER): ICD-10-CM

## 2024-05-28 DIAGNOSIS — F32.1 MODERATE MAJOR DEPRESSION (H): ICD-10-CM

## 2024-05-30 RX ORDER — CITALOPRAM HYDROBROMIDE 20 MG/1
20 TABLET ORAL DAILY
Qty: 90 TABLET | Refills: 0 | Status: SHIPPED | OUTPATIENT
Start: 2024-05-30

## 2024-07-06 ENCOUNTER — HEALTH MAINTENANCE LETTER (OUTPATIENT)
Age: 39
End: 2024-07-06

## 2025-07-13 ENCOUNTER — HEALTH MAINTENANCE LETTER (OUTPATIENT)
Age: 40
End: 2025-07-13